# Patient Record
Sex: FEMALE | Race: WHITE | Employment: FULL TIME | URBAN - METROPOLITAN AREA
[De-identification: names, ages, dates, MRNs, and addresses within clinical notes are randomized per-mention and may not be internally consistent; named-entity substitution may affect disease eponyms.]

---

## 2017-02-10 ENCOUNTER — OFFICE VISIT (OUTPATIENT)
Dept: FAMILY MEDICINE CLINIC | Age: 52
End: 2017-02-10

## 2017-02-10 VITALS
RESPIRATION RATE: 16 BRPM | HEIGHT: 68 IN | WEIGHT: 230.38 LBS | DIASTOLIC BLOOD PRESSURE: 64 MMHG | SYSTOLIC BLOOD PRESSURE: 104 MMHG | HEART RATE: 69 BPM | OXYGEN SATURATION: 98 % | TEMPERATURE: 98.1 F | BODY MASS INDEX: 34.92 KG/M2

## 2017-02-10 DIAGNOSIS — M25.572 ACUTE LEFT ANKLE PAIN: Primary | ICD-10-CM

## 2017-02-10 DIAGNOSIS — J01.00 ACUTE MAXILLARY SINUSITIS, RECURRENCE NOT SPECIFIED: ICD-10-CM

## 2017-02-10 RX ORDER — AZITHROMYCIN 250 MG/1
TABLET, FILM COATED ORAL
Qty: 6 TAB | Refills: 0 | Status: SHIPPED | OUTPATIENT
Start: 2017-02-10 | End: 2017-08-29 | Stop reason: ALTCHOICE

## 2017-02-10 NOTE — PROGRESS NOTES
1. Have you been to the ER, urgent care clinic since your last visit? Hospitalized since your last visit? No    2. Have you seen or consulted any other health care providers outside of the [address] [place] [address] since your last visit? Include any pap smears or colon screening. Yes Dr Maren Chavira 1/16/17 for back & neck pain    Chief Complaint   Patient presents with    Foot Pain     left, since 8/2016    Cold Symptoms     sinus drainage, sinus pressure, ear fullness, fatigue x 2 days     Pt states she has left foot pain sin 8/2016. She also reports sinus drainage, sinus pressure, ear fullness & fatigue x 2 days. She is currently on sudafed.

## 2017-02-10 NOTE — MR AVS SNAPSHOT
Visit Information Date & Time Provider Department Dept. Phone Encounter #  
 2/10/2017  2:45 PM [address] [phone] [id number] Upcoming Health Maintenance Date Due DTaP/Tdap/Td series (1 - Tdap) 4/24/1984 BREAST CANCER SCRN MAMMOGRAM 7/1/2018 PAP AKA CERVICAL CYTOLOGY 7/1/2019 COLONOSCOPY 4/2/2022 Allergies as of 2/10/2017  Review Complete On: 2/10/2017 By: Remigio Serrano LPN Severity Noted Reaction Type Reactions Amoxicillin  08/16/2011    Hives Codeine  08/16/2011    Nausea and Vomiting Current Immunizations  Never Reviewed No immunizations on file. Not reviewed this visit You Were Diagnosed With   
  
 Codes Comments Acute left ankle pain    -  Primary ICD-10-CM: M25.572 ICD-9-CM: 719.47 Acute maxillary sinusitis, recurrence not specified     ICD-10-CM: J01.00 ICD-9-CM: 461.0 Vitals BP Pulse Temp Resp Height(growth percentile) Weight(growth percentile) 104/64 69 98.1 °F (36.7 °C) (Oral) 16 5' 8\" (1.727 m) 230 lb 6 oz (104.5 kg) SpO2 BMI OB Status Smoking Status 98% 35.03 kg/m2 Ablation Never Smoker Vitals History BMI and BSA Data Body Mass Index Body Surface Area 35.03 kg/m 2 2.24 m 2 Preferred Pharmacy Pharmacy Name Phone [id number] [address] [phone] Your Updated Medication List  
  
   
This list is accurate as of: 2/10/17  3:23 PM.  Always use your most recent med list.  
  
  
  
  
 * azithromycin 250 mg tablet Commonly known as:  Jock Bone Take two tablets today then one tablet daily * azithromycin 250 mg tablet Commonly known as:  Jock Bone Take two tablets today then one tablet daily CALCIUM 600 + D 600-125 mg-unit Tab Generic drug:  calcium-cholecalciferol (d3) Take  by mouth. FLONASE 50 mcg/actuation nasal spray Generic drug:  fluticasone 2 Sprays by Both Nostrils route daily. multivitamin tablet Commonly known as:  ONE A DAY Take 1 Tab by mouth daily. nystatin topical cream  
Commonly known as:  MYCOSTATIN Apply  to affected area two (2) times a day. PEPCID AC 10 mg tablet Generic drug:  famotidine Take 10 mg by mouth daily. VERAMYST 27.5 mcg/actuation nasal spray Generic drug:  fluticasone USE 2 SPRAYS NASALLY DAILY ZyrTEC 10 mg tablet Generic drug:  cetirizine Take  by mouth daily. * Notice: This list has 2 medication(s) that are the same as other medications prescribed for you. Read the directions carefully, and ask your doctor or other care provider to review them with you. Prescriptions Sent to Pharmacy Refills  
 azithromycin (ZITHROMAX Z-ADRIANNA) 250 mg tablet 0 Sig: Take two tablets today then one tablet daily Class: Normal  
 Pharmacy: [facility] Ashwini Baptiste, [facility] Ph #: [phone] We Performed the Following REFERRAL TO PODIATRY [REF90 Custom] Comments:  
 Please evaluate patient for left ankle pain/achilles tendon. To-Do List   
 02/17/2017 Imaging:  XR ANKLE LT MIN 3 V Referral Information Referral ID Referred By Referred To  
  
 [id number] Porfirio OTOOLE [address] [place] [address] Visits Status Start Date End Date 99 New Request 2/10/17 2/10/18 If your referral has a status of pending review or denied, additional information will be sent to support the outcome of this decision. Introducing [facility] & HEALTH SERVICES! Dear Connor Shepard: Thank you for requesting a [facility] account. Our records indicate that you have previously registered for a [facility] account but its currently inactive. Please call our [employer] [facility] support line at [phone]. Additional Information If you have questions, please visit the Frequently Asked Questions section of the [employer] website at [email or web address] Remember, [employer] is NOT to be used for urgent needs. For medical emergencies, dial 911. Now available from your iPhone and Android! Please provide this summary of care documentation to your next provider. Your primary care clinician is listed as Socorro Matthew. If you have any questions after today's visit, please call [phone].

## 2017-02-14 ENCOUNTER — TELEPHONE (OUTPATIENT)
Dept: FAMILY MEDICINE CLINIC | Age: 52
End: 2017-02-14

## 2017-02-14 NOTE — TELEPHONE ENCOUNTER
Notified pt her ankle x-ray was normal. Pt asked where does she go from here since she is still in pain. I informed she needs to see the podiatrist that Candida Gutierrez referred her to in order to evaluate that it is not a ligament problem. Dr Vero Mirza @ [phone]. Pt verbalized understanding.

## 2017-02-19 NOTE — PROGRESS NOTES
HPI/ROS  Patient complains of bilateral ear pressure/pain. Symptoms include congestion, headache described as frontal, lightheadedness, low grade fever, post nasal drip, productive cough with  yellow colored sputum, sinus pressure, tooth pain and vertigo. Onset of symptoms was 5 days ago, gradually worsening since that time. Patient is drinking plenty of fluids. .  Past history is significant for no history of pneumonia or bronchitis. Patient is non-smoker. She is using sudafed for congestion without improvement. She is having acute left ankle pain and swelling  Unknown injury    Visit Vitals    /64    Pulse 69    Temp 98.1 °F (36.7 °C) (Oral)    Resp 16    Ht 5' 8\" (1.727 m)    Wt 230 lb 6 oz (104.5 kg)    SpO2 98%    BMI 35.03 kg/m2     Physical Examination:   GENERAL ASSESSMENT: well developed and well nourished  SKIN: normal color, no lesions  HEAD: normocephalic  EYES: normal eyes  EARS: external auditory canal: clear and tympanic membrane: yellow, bulging  NOSE: normal external appearance and nares patent  MOUTH: yellow exudates of OP  NECK: normal  CHEST: normal air exchange, no rales, no rhonchi, no wheezes, respiratory effort normal with no retractions  HEART: regular rate and rhythm, normal S1/S2, no murmurs  ABDOMEN:  not examined  EXTREMITY: not examined  NEURO: not examined    Latasha Ruiz was seen today for foot pain and cold symptoms. Diagnoses and all orders for this visit:    Acute left ankle pain  -     XR ANKLE LT MIN 3 V; Future  -     REFERRAL TO PODIATRY    Acute maxillary sinusitis, recurrence not specified    Other orders  -     azithromycin (ZITHROMAX Z-ADRIANNA) 250 mg tablet; Take two tablets today then one tablet daily    xray ordered for left ankle evaluation  Recommended nsaids and ice    Reveiwed adr/se of medication  Push fluids, rest, suggested mucinex for congestion and drainage  Recheck 5-7 days if sx not improved.     I have discussed the diagnosis with the patient and the intended plan as seen in the above orders. The patient has received an after-visit summary and questions were answered concerning future plans. Patient conveyed understanding of the plan at the time of the visit.     Lior Golden MSN, ANP  2/19/2017

## 2017-02-22 DIAGNOSIS — M25.572 ACUTE LEFT ANKLE PAIN: ICD-10-CM

## 2017-08-29 ENCOUNTER — OFFICE VISIT (OUTPATIENT)
Dept: FAMILY MEDICINE CLINIC | Age: 52
End: 2017-08-29

## 2017-08-29 VITALS
WEIGHT: 232 LBS | RESPIRATION RATE: 12 BRPM | TEMPERATURE: 98.7 F | SYSTOLIC BLOOD PRESSURE: 118 MMHG | HEIGHT: 68 IN | BODY MASS INDEX: 35.16 KG/M2 | HEART RATE: 54 BPM | OXYGEN SATURATION: 99 % | DIASTOLIC BLOOD PRESSURE: 64 MMHG

## 2017-08-29 DIAGNOSIS — Z00.00 WELL ADULT EXAM: Primary | ICD-10-CM

## 2017-08-29 DIAGNOSIS — R73.01 IMPAIRED FASTING BLOOD SUGAR: ICD-10-CM
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
## 2017-08-29 DIAGNOSIS — S40.262A TICK BITE OF SHOULDER, LEFT, INITIAL ENCOUNTER: ICD-10-CM

## 2017-08-29 DIAGNOSIS — Z23 NEED FOR TDAP VACCINATION: ICD-10-CM

## 2017-08-29 RX ORDER — DOXYCYCLINE 100 MG/1
100 TABLET ORAL 2 TIMES DAILY
Qty: 14 TAB | Refills: 0 | Status: SHIPPED | OUTPATIENT
Start: 2017-08-29 | End: 2017-09-05

## 2017-08-29 NOTE — PROGRESS NOTES
Chief Complaint   Patient presents with    Complete Physical    Labs     fasting    Insect Bite     tick bite over the weekend

## 2017-08-29 NOTE — MR AVS SNAPSHOT
Visit Information Date & Time Provider Department Dept. Phone Encounter #  
 8/29/2017  7:30 AM Froy Collins NP [address] [facility] [phone] [id number] Upcoming Health Maintenance Date Due DTaP/Tdap/Td series (1 - Tdap) 4/24/1984 BREAST CANCER SCRN MAMMOGRAM 7/18/2018 PAP AKA CERVICAL CYTOLOGY 7/1/2019 COLONOSCOPY 4/2/2022 Allergies as of 8/29/2017  Review Complete On: 8/29/2017 By: Froy Collins NP Severity Noted Reaction Type Reactions Amoxicillin  08/16/2011    Hives Codeine  08/16/2011    Nausea and Vomiting Current Immunizations  Never Reviewed Name Date Tdap  Incomplete Not reviewed this visit You Were Diagnosed With   
  
 Codes Comments Well adult exam    -  Primary ICD-10-CM: Z00.00 ICD-9-CM: V70.0 Impaired fasting blood sugar     ICD-10-CM: R73.01 
ICD-9-CM: 790.21 Tick bite of shoulder, left, initial encounter     ICD-10-CM: O09.362I, W57. Eulalio Seal ICD-9-CM: 912.4, E906.4 Need for Tdap vaccination     ICD-10-CM: C23 ICD-9-CM: V06.1 Vitals BP Pulse Temp Resp Height(growth percentile) Weight(growth percentile)  
 118/64 (!) 54 98.7 °F (37.1 °C) 12 5' 8\" (1.727 m) 232 lb (105.2 kg) SpO2 BMI OB Status Smoking Status 99% 35.28 kg/m2 Ablation Never Smoker Vitals History BMI and BSA Data Body Mass Index Body Surface Area  
 35.28 kg/m 2 2.25 m 2 Preferred Pharmacy Pharmacy Name Phone [facility] [address] [phone] Your Updated Medication List  
  
   
This list is accurate as of: 8/29/17  7:40 AM.  Always use your most recent med list.  
  
  
  
  
 CALCIUM 600 + D 600-125 mg-unit Tab Generic drug:  calcium-cholecalciferol (d3) Take  by mouth. doxycycline 100 mg tablet Commonly known as:  ADOXA Take 1 Tab by mouth two (2) times a day for 7 days. FLONASE 50 mcg/actuation nasal spray Generic drug:  fluticasone 2 Sprays by Both Nostrils route daily. multivitamin tablet Commonly known as:  ONE A DAY Take 1 Tab by mouth daily. ZyrTEC 10 mg tablet Generic drug:  cetirizine Take  by mouth daily. Prescriptions Sent to Pharmacy Refills  
 doxycycline (ADOXA) 100 mg tablet 0 Sig: Take 1 Tab by mouth two (2) times a day for 7 days. Class: Normal  
 Pharmacy: [facility] Ashwini Baptiste, [facility] [facility] Ph #: [phone] Route: Oral  
  
We Performed the Following CBC WITH AUTOMATED DIFF [30562 CPT(R)] HEMOGLOBIN A1C WITH EAG [63571 CPT(R)] LIPID PANEL [42179 CPT(R)] METABOLIC PANEL, COMPREHENSIVE [73152 CPT(R)] TETANUS, DIPHTHERIA TOXOIDS AND ACELLULAR PERTUSSIS VACCINE (TDAP), IN INDIVIDS. >=7, IM [id number] CPT(R)] TSH 3RD GENERATION [34006 CPT(R)] Introducing [facility] SERVICES! Dear Bibi Smalls: Thank you for requesting a Get[employer] account. Our records indicate that you already have an active Get[employer] account. You can access your account anytime at [email or web address] Did you know that you can access your hospital and ER discharge instructions at any time in Get10? You can also review all of your test results from your hospital stay or ER visit. Additional Information If you have questions, please visit the Frequently Asked Questions section of the Get10 website at [email or web address] Remember, Get[employer] is NOT to be used for urgent needs. For medical emergencies, dial 911. Now available from your iPhone and Android! Please provide this summary of care documentation to your next provider. Your primary care clinician is listed as Izzy Rodrigues. If you have any questions after today's visit, please call [phone].

## 2017-08-29 NOTE — PROGRESS NOTES
Subjective:   45 y.o. female for Well Woman Check. Her gyne and breast care is done elsewhere by her Ob-Gyne physician. Patient Active Problem List    Diagnosis Date Noted    Bradycardia 11/13/2015    Allergic rhinitis 08/16/2011    Impaired fasting blood sugar 08/16/2011     Current Outpatient Prescriptions   Medication Sig Dispense Refill    doxycycline (ADOXA) 100 mg tablet Take 1 Tab by mouth two (2) times a day for 7 days. 14 Tab 0    fluticasone (FLONASE) 50 mcg/actuation nasal spray 2 Sprays by Both Nostrils route daily.  calcium-cholecalciferol, d3, (CALCIUM 600 + D) 600-125 mg-unit tab Take  by mouth.  multivitamin (ONE A DAY) tablet Take 1 Tab by mouth daily.  cetirizine (ZYRTEC) 10 mg tablet Take  by mouth daily. Family History   Problem Relation Age of Onset    Diabetes Father     High Cholesterol Father     Cancer Father      colon cancer    Other Father      womack's esophagus    Diabetes Sister     Cancer Mother      melenoma    Heart Disease Sister      Social History   Substance Use Topics    Smoking status: Never Smoker    Smokeless tobacco: Never Used    Alcohol use 0.0 oz/week     0 Standard drinks or equivalent per week      Comment: socially        ROS: Feeling generally well. No TIA's or unusual headaches, no dysphagia. No prolonged cough. No dyspnea or chest pain on exertion. No abdominal pain, change in bowel habits, black or bloody stools. No urinary tract symptoms. No new or unusual musculoskeletal symptoms. Specific concerns today: she has a swollen tick bite from last week that is red and itches severely, no ring formation. Objective: The patient appears well, alert, oriented x 3, in no distress. Visit Vitals    /64    Pulse (!) 54    Temp 98.7 °F (37.1 °C)    Resp 12    Ht 5' 8\" (1.727 m)    Wt 232 lb (105.2 kg)    SpO2 99%    BMI 35.28 kg/m2     ENT normal.  Neck supple. No adenopathy or thyromegaly. MICHAELA.  Lungs are clear, good air entry, no wheezes, rhonchi or rales. S1 and S2 normal, no murmurs, regular rate and rhythm. Abdomen soft without tenderness, guarding, mass or organomegaly. Extremities show no edema, normal peripheral pulses. Neurological is normal, no focal findings. Breast and Pelvic exams are deferred. Assessment/Plan:   Well Woman  lose weight, increase physical activity, follow low fat diet, follow low salt diet, routine labs ordered  Encounter Diagnoses   Name Primary?  Well adult exam Yes    Impaired fasting blood sugar     Tick bite of shoulder, left, initial encounter     Need for Tdap vaccination      Orders Placed This Encounter    TETANUS, DIPHTHERIA TOXOIDS AND ACELLULAR PERTUSSIS VACCINE (TDAP), IN INDIVIDS. >=7, IM    CBC WITH AUTOMATED DIFF    METABOLIC PANEL, COMPREHENSIVE    LIPID PANEL    HEMOGLOBIN A1C WITH EAG    TSH 3RD GENERATION    doxycycline (ADOXA) 100 mg tablet     Given doxy x 7 days for Lymes coverage  Labs updated as well  tdap booster given    I have discussed the diagnosis with the patient and the intended plan as seen in the above orders. The patient has received an after-visit summary and questions were answered concerning future plans. Patient conveyed understanding of the plan at the time of the visit.     Rafi Pena, MSN, ANP  8/29/2017

## 2017-08-30 LAB
ALBUMIN SERPL-MCNC: 3.8 G/DL (ref 3.5–5.5)
ALBUMIN/GLOB SERPL: 1.4 {RATIO} (ref 1.2–2.2)
ALP SERPL-CCNC: 71 IU/L (ref 39–117)
ALT SERPL-CCNC: 23 IU/L (ref 0–32)
AST SERPL-CCNC: 16 IU/L (ref 0–40)
BASOPHILS # BLD AUTO: 0 X10E3/UL (ref 0–0.2)
BASOPHILS NFR BLD AUTO: 1 %
BILIRUB SERPL-MCNC: 0.4 MG/DL (ref 0–1.2)
BUN SERPL-MCNC: 13 MG/DL (ref 6–24)
BUN/CREAT SERPL: 17 (ref 9–23)
CALCIUM SERPL-MCNC: 9.2 MG/DL (ref 8.7–10.2)
CHLORIDE SERPL-SCNC: 100 MMOL/L (ref 96–106)
CHOLEST SERPL-MCNC: 183 MG/DL (ref 100–199)
CO2 SERPL-SCNC: 26 MMOL/L (ref 18–29)
CREAT SERPL-MCNC: 0.78 MG/DL (ref 0.57–1)
EOSINOPHIL # BLD AUTO: 0.2 X10E3/UL (ref 0–0.4)
EOSINOPHIL NFR BLD AUTO: 3 %
ERYTHROCYTE [DISTWIDTH] IN BLOOD BY AUTOMATED COUNT: 13 % (ref 12.3–15.4)
EST. AVERAGE GLUCOSE BLD GHB EST-MCNC: 117 MG/DL
GLOBULIN SER CALC-MCNC: 2.7 G/DL (ref 1.5–4.5)
GLUCOSE SERPL-MCNC: 94 MG/DL (ref 65–99)
HBA1C MFR BLD: 5.7 % (ref 4.8–5.6)
HCT VFR BLD AUTO: 42.9 % (ref 34–46.6)
HDLC SERPL-MCNC: 47 MG/DL
HGB BLD-MCNC: 14.2 G/DL (ref 11.1–15.9)
IMM GRANULOCYTES # BLD: 0 X10E3/UL (ref 0–0.1)
IMM GRANULOCYTES NFR BLD: 0 %
INTERPRETATION, 910389: NORMAL
LDLC SERPL CALC-MCNC: 114 MG/DL (ref 0–99)
LYMPHOCYTES # BLD AUTO: 2.1 X10E3/UL (ref 0.7–3.1)
LYMPHOCYTES NFR BLD AUTO: 31 %
MCH RBC QN AUTO: 30.7 PG (ref 26.6–33)
MCHC RBC AUTO-ENTMCNC: 33.1 G/DL (ref 31.5–35.7)
MCV RBC AUTO: 93 FL (ref 79–97)
MONOCYTES # BLD AUTO: 0.5 X10E3/UL (ref 0.1–0.9)
MONOCYTES NFR BLD AUTO: 8 %
NEUTROPHILS # BLD AUTO: 3.8 X10E3/UL (ref 1.4–7)
NEUTROPHILS NFR BLD AUTO: 57 %
PLATELET # BLD AUTO: 241 X10E3/UL (ref 150–379)
POTASSIUM SERPL-SCNC: 4.4 MMOL/L (ref 3.5–5.2)
PROT SERPL-MCNC: 6.5 G/DL (ref 6–8.5)
RBC # BLD AUTO: 4.63 X10E6/UL (ref 3.77–5.28)
SODIUM SERPL-SCNC: 139 MMOL/L (ref 134–144)
TRIGL SERPL-MCNC: 112 MG/DL (ref 0–149)
TSH SERPL DL<=0.005 MIU/L-ACNC: 2.96 UIU/ML (ref 0.45–4.5)
VLDLC SERPL CALC-MCNC: 22 MG/DL (ref 5–40)
WBC # BLD AUTO: 6.6 X10E3/UL (ref 3.4–10.8)

## 2017-09-01 NOTE — PROGRESS NOTES
Hey there, your labs look great! Your cholesterol has gone up slightly. Watch the diet for red meat/pork, dairy products, and fried/fast foods.   Recheck 6 months fasting, Christel

## 2017-10-16 ENCOUNTER — OFFICE VISIT (OUTPATIENT)
Dept: FAMILY MEDICINE CLINIC | Age: 52
End: 2017-10-16

## 2017-10-16 VITALS
RESPIRATION RATE: 16 BRPM | HEIGHT: 68 IN | BODY MASS INDEX: 35.52 KG/M2 | TEMPERATURE: 97.8 F | HEART RATE: 60 BPM | WEIGHT: 234.38 LBS | SYSTOLIC BLOOD PRESSURE: 120 MMHG | OXYGEN SATURATION: 96 % | DIASTOLIC BLOOD PRESSURE: 82 MMHG

## 2017-10-16 DIAGNOSIS — G44.89 OTHER HEADACHE SYNDROME: Primary | ICD-10-CM

## 2017-10-16 RX ORDER — VALACYCLOVIR HYDROCHLORIDE 500 MG/1
500 TABLET, FILM COATED ORAL 3 TIMES DAILY
Qty: 21 TAB | Refills: 0 | Status: SHIPPED | OUTPATIENT
Start: 2017-10-16 | End: 2017-10-23

## 2017-10-16 NOTE — MR AVS SNAPSHOT
Visit Information Date & Time Provider Department Dept. Phone Encounter #  
 10/16/2017  1:15 PM Chaz Briseno [phone] [id number] Upcoming Health Maintenance Date Due  
 BREAST CANCER SCRN MAMMOGRAM 7/18/2018 PAP AKA CERVICAL CYTOLOGY 7/1/2019 COLONOSCOPY 4/2/2022 DTaP/Tdap/Td series (2 - Td) 8/29/2027 Allergies as of 10/16/2017  Review Complete On: 10/16/2017 By: Ran Trammell LPN Severity Noted Reaction Type Reactions Amoxicillin  08/16/2011    Hives Codeine  08/16/2011    Nausea and Vomiting Current Immunizations  Reviewed on 8/29/2017 Name Date Tdap 8/29/2017 Not reviewed this visit You Were Diagnosed With   
  
 Codes Comments Other headache syndrome    -  Primary ICD-10-CM: G44.89 ICD-9-CM: 339.89 Vitals BP Pulse Temp Resp Height(growth percentile) Weight(growth percentile) 120/82 60 97.8 °F (36.6 °C) (Oral) 16 5' 8\" (1.727 m) 234 lb 6 oz (106.3 kg) SpO2 BMI OB Status Smoking Status 96% 35.64 kg/m2 Ablation Never Smoker Vitals History BMI and BSA Data Body Mass Index Body Surface Area  
 35.64 kg/m 2 2.26 m 2 Preferred Pharmacy Pharmacy Name Phone [facility] [address] [phone] Your Updated Medication List  
  
   
This list is accurate as of: 10/16/17  1:43 PM.  Always use your most recent med list.  
  
  
  
  
 CALCIUM 600 + D 600-125 mg-unit Tab Generic drug:  calcium-cholecalciferol (d3) Take  by mouth. FLONASE 50 mcg/actuation nasal spray Generic drug:  fluticasone 2 Sprays by Both Nostrils route daily. multivitamin tablet Commonly known as:  ONE A DAY Take 1 Tab by mouth daily. valACYclovir 500 mg tablet Commonly known as:  VALTREX Take 1 Tab by mouth three (3) times daily for 7 days. ZyrTEC 10 mg tablet Generic drug:  cetirizine Take  by mouth daily. Prescriptions Sent to Pharmacy Refills  
 valACYclovir (VALTREX) 500 mg tablet 0 Sig: Take 1 Tab by mouth three (3) times daily for 7 days. Class: Normal  
 Pharmacy: [address]ini Baptiste, [address] [facility]  #: [phone] Route: Oral  
  
To-Do List   
 10/20/2017 Imaging:  CT HEAD W WO CONT Introducing [facility] SERVICES! Dear Emily Collins: Thank you for requesting a [employer]Sick account. Our records indicate that you already have an active NoteSick account. You can access your account anytime at [email or web address]"[employer]/"[employer]" Did you know that you can access your hospital and ER discharge instructions at any time in NoteSick? You can also review all of your test results from your hospital stay or ER visit. Additional Information If you have questions, please visit the Frequently Asked Questions section of the NoteSick website at https://"[employer]/"[employer]"/. Remember, NoteSick is NOT to be used for urgent needs. For medical emergencies, dial 911. Now available from your iPhone and Android! Please provide this summary of care documentation to your next provider. Your primary care clinician is listed as Angelina Khoury. If you have any questions after today's visit, please call [phone].

## 2017-10-16 NOTE — PROGRESS NOTES
1. Have you been to the ER, urgent care clinic since your last visit? Hospitalized since your last visit? Yes [facility] on 10/13/17 for mammogram    2. Have you seen or consulted any other health care providers outside of the [address] [place] [address] since your last visit? Include any pap smears or colon screening. No    Chief Complaint   Patient presents with    Hair/Scalp Problem     left side of head the scalp is sore x 3 wks     Pt states she has soreness of the left side of the scalp x 3 wks.

## 2017-10-16 NOTE — PROGRESS NOTES
HISTORY OF PRESENT ILLNESS  Shonna Garcia is a 45 y.o. female. HPI  Here for left sided and back of head pain, feels superficial like bumped head but no brusing, rash, or known injury  Pretty constant for 3 weeks  Does have migraine but not like that, has not been sick, no neck pain  Does have quite a bit of stress  No vision changes  otc nsaids have done nothing for the pain    ROS  A comprehensive review of system was obtained and negative except findings in the HPI    Visit Vitals    /82    Pulse 60    Temp 97.8 °F (36.6 °C) (Oral)    Resp 16    Ht 5' 8\" (1.727 m)    Wt 234 lb 6 oz (106.3 kg)    SpO2 96%    BMI 35.64 kg/m2     Physical Exam   Constitutional: She is oriented to person, place, and time. She appears well-developed and well-nourished. Neck: No JVD present. Cardiovascular: Normal rate, regular rhythm and intact distal pulses. Exam reveals no gallop and no friction rub. No murmur heard. Pulmonary/Chest: Effort normal and breath sounds normal. No respiratory distress. She has no wheezes. Musculoskeletal: She exhibits no edema. Neurological: She is alert and oriented to person, place, and time. Skin: Skin is warm. No rash noted. No erythema. Completely normal scalp and head assessment   Nursing note and vitals reviewed. ASSESSMENT and PLAN  Encounter Diagnoses   Name Primary?  Other headache syndrome Yes     Orders Placed This Encounter    CT HEAD W WO CONT    valACYclovir (VALTREX) 500 mg tablet     CT Head to eval for arteritis etc  Start valtrex for ?shingles type pain  Dev plan with results    I have discussed the diagnosis with the patient and the intended plan as seen in the above orders. The patient has received an after-visit summary and questions were answered concerning future plans. Patient conveyed understanding of the plan at the time of the visit.     Angelina Khoury, MSN, ANP  10/16/2017

## 2017-10-18 ENCOUNTER — HOSPITAL ENCOUNTER (OUTPATIENT)
Dept: CT IMAGING | Age: 52
Discharge: HOME | End: 2017-10-18
Attending: NURSE PRACTITIONER
Payer: COMMERCIAL

## 2017-10-18 DIAGNOSIS — G44.89 OTHER HEADACHE SYNDROME: ICD-10-CM

## 2017-10-18 PROCEDURE — 70450 CT HEAD/BRAIN W/O DYE: CPT

## 2017-10-18 NOTE — PROGRESS NOTES
Hey there, your CT shows just an old fracture of the left jaw that has some arthritis in it. No signs of mass, stroke or any issues of the brain. I would treat this as a spasm/tension type headache and pain with nsaids and muscle relaxer.  Rg Rosales

## 2018-02-28 ENCOUNTER — OFFICE VISIT (OUTPATIENT)
Dept: FAMILY MEDICINE CLINIC | Age: 53
End: 2018-02-28

## 2018-02-28 VITALS
TEMPERATURE: 97.9 F | HEIGHT: 68 IN | SYSTOLIC BLOOD PRESSURE: 109 MMHG | OXYGEN SATURATION: 98 % | RESPIRATION RATE: 16 BRPM | BODY MASS INDEX: 35.52 KG/M2 | WEIGHT: 234.38 LBS | HEART RATE: 86 BPM | DIASTOLIC BLOOD PRESSURE: 71 MMHG

## 2018-02-28 DIAGNOSIS — E78.00 HYPERCHOLESTEREMIA: ICD-10-CM

## 2018-02-28 DIAGNOSIS — G43.009 MIGRAINE WITHOUT AURA AND WITHOUT STATUS MIGRAINOSUS, NOT INTRACTABLE: Primary | ICD-10-CM

## 2018-02-28 RX ORDER — LORATADINE 10 MG/1
10 TABLET ORAL
COMMUNITY

## 2018-02-28 RX ORDER — KETOROLAC TROMETHAMINE 30 MG/ML
60 INJECTION, SOLUTION INTRAMUSCULAR; INTRAVENOUS ONCE
Qty: 2 ML | Refills: 0
Start: 2018-02-28 | End: 2018-02-28

## 2018-02-28 RX ORDER — ZOLMITRIPTAN 5 MG/1
5 TABLET, ORALLY DISINTEGRATING ORAL AS NEEDED
Qty: 9 TAB | Refills: 5 | Status: SHIPPED | OUTPATIENT
Start: 2018-02-28 | End: 2018-04-24 | Stop reason: SINTOL

## 2018-02-28 NOTE — PROGRESS NOTES
HISTORY OF PRESENT ILLNESS  Camelia Cifuentes is a 45 y.o. female. HPI  Patient here for follow up high cholesterol  Doing diet changes and no medication  Trying to eat more low fat  When she fasts for any labs or procedures it triggers a severe migraine that includes photophobia and nausea  Only using advil, has never been given actual migraine medication  When she had colonscopy last mo she had Toradol shot in that office to do the nausea/vomiting from fasting for the procedure  The FamHx, SocHx, MedHx, Medication, and Allergy lists have been reviewed and updated in the chart. ROS  A comprehensive review of system was obtained and negative except findings in the HPI    Visit Vitals    /71    Pulse 86    Temp 97.9 °F (36.6 °C) (Oral)    Resp 16    Ht 5' 8\" (1.727 m)    Wt 234 lb 6 oz (106.3 kg)    SpO2 98%    BMI 35.64 kg/m2     Physical Exam   Constitutional: She is oriented to person, place, and time. She appears well-developed and well-nourished. Neck: No JVD present. Cardiovascular: Normal rate, regular rhythm and intact distal pulses. Exam reveals no gallop and no friction rub. No murmur heard. Pulmonary/Chest: Effort normal and breath sounds normal. No respiratory distress. She has no wheezes. Musculoskeletal: She exhibits no edema. Neurological: She is alert and oriented to person, place, and time. Skin: Skin is warm. Nursing note and vitals reviewed. ASSESSMENT and PLAN  Encounter Diagnoses   Name Primary?     Migraine without aura and without status migrainosus, not intractable Yes    Hypercholesteremia      Orders Placed This Encounter    LIPID PANEL    loratadine (CLARITIN) 10 mg tablet    ZOLMitriptan (ZOMIG-ZMT) 5 mg disintegrating tablet    ketorolac tromethamine (TORADOL) 60 mg/2 mL soln     toradol IM 60mg given today for migraine  Start zomig MLT for prophalaxis for migraine when fasting  Labs updated today    I have discussed the diagnosis with the patient and the intended plan as seen in the above orders. The patient has received an after-visit summary and questions were answered concerning future plans. Patient conveyed understanding of the plan at the time of the visit.     Angel Burger MSN, ANP  2/28/2018

## 2018-02-28 NOTE — MR AVS SNAPSHOT
[address] [address] 
[phone] Patient: Meri Vogel MRN: [id number] :1965 Visit Information Date & Time Provider Department Dept. Phone Encounter #  
 2018  9:15 AM Valentín Maguire NP 5900 [facility] [phone] [id number] Your Appointments 2018  9:15 AM  
ESTABLISHED PATIENT with Valentín Maguire NP [address] [facility] ([facility]) Appt Note: 6 month f/u,  fasting bloodwork, james 18  
 [id number] [place] [address] Road [address] [phone]  
  
   
 [address] [place] [address] Road [address] Upcoming Health Maintenance Date Due  
 BREAST CANCER SCRN MAMMOGRAM 2018 PAP AKA CERVICAL CYTOLOGY 2019 COLONOSCOPY 2022 DTaP/Tdap/Td series (2 - Td) 2027 Allergies as of 2018  Review Complete On: 2018 By: Jasen Bailon LPN Severity Noted Reaction Type Reactions Amoxicillin  2011    Hives Codeine  2011    Nausea and Vomiting Current Immunizations  Reviewed on 2017 Name Date Tdap 2017 Not reviewed this visit You Were Diagnosed With   
  
 Codes Comments Migraine without aura and without status migrainosus, not intractable    -  Primary ICD-10-CM: G43.009 ICD-9-CM: 346.10 Hypercholesteremia     ICD-10-CM: E78.00 ICD-9-CM: 272.0 Vitals BP Pulse Temp Resp Height(growth percentile) Weight(growth percentile) 109/71 86 97.9 °F (36.6 °C) (Oral) 16 5' 8\" (1.727 m) 234 lb 6 oz (106.3 kg) SpO2 BMI OB Status Smoking Status 98% 35.64 kg/m2 Ablation Never Smoker Vitals History BMI and BSA Data Body Mass Index Body Surface Area  
 35.64 kg/m 2 2.26 m 2 Preferred Pharmacy Pharmacy Name Phone [id number] & [address] [phone] Your Updated Medication List  
  
   
 This list is accurate as of 2/28/18  9:13 AM.  Always use your most recent med list.  
  
  
  
  
 CALCIUM 600 + D 600-125 mg-unit Tab Generic drug:  calcium-cholecalciferol (d3) Take  by mouth. CLARITIN 10 mg tablet Generic drug:  loratadine Take 10 mg by mouth. FLONASE 50 mcg/actuation nasal spray Generic drug:  fluticasone 2 Sprays by Both Nostrils route daily. ketorolac tromethamine 60 mg/2 mL Soln Commonly known as:  TORADOL  
2 mL by IntraMUSCular route once for 1 dose. multivitamin tablet Commonly known as:  ONE A DAY Take 1 Tab by mouth daily. ZOLMitriptan 5 mg disintegrating tablet Commonly known as:  ZOMIG-ZMT Take 1 Tab by mouth as needed for Migraine. Repeat 2 hours if not completely gone, no more than 2 in 24 hours ZyrTEC 10 mg tablet Generic drug:  cetirizine Take  by mouth daily. Prescriptions Sent to Pharmacy Refills ZOLMitriptan (ZOMIG-ZMT) 5 mg disintegrating tablet 5 Sig: Take 1 Tab by mouth as needed for Migraine. Repeat 2 hours if not completely gone, no more than 2 in 24 hours Class: Normal  
 Pharmacy: [facility] Ashwini Baptiste, [address] [place] [address],[address] Ph #: [phone] Route: Oral  
  
We Performed the Following KETOROLAC TROMETHAMINE INJ [ [facility]] LIPID PANEL [25300 CPT(R)] GA THER/PROPH/DIAG INJECTION, SUBCUT/IM [id number] CPT(R)] Introducing [facility] & HEALTH SERVICES! Dear Lynn Perdomo: Thank you for requesting a bluebird bio account. Our records indicate that you already have an active bluebird bio account. You can access your account anytime at [email or web address] Did you know that you can access your hospital and ER discharge instructions at any time in bluebird bio? You can also review all of your test results from your hospital stay or ER visit. Additional Information If you have questions, please visit the Frequently Asked Questions section of the Branch website at [email or web address] Remember, Branch is NOT to be used for urgent needs. For medical emergencies, dial 911. Now available from your iPhone and Android! Please provide this summary of care documentation to your next provider. Your primary care clinician is listed as Indira Santana. If you have any questions after today's visit, please call [phone].

## 2018-02-28 NOTE — PROGRESS NOTES
1. Have you been to the ER, urgent care clinic since your last visit? Hospitalized since your last visit? No    2. Have you seen or consulted any other health care providers outside of the [address] [place] [address] since your last visit? Include any pap smears or colon screening. No    Chief Complaint   Patient presents with    Follow-up     6 mo f/u, med ck    Labs    Headache     pt currently has a migraine, only takes ibuprofen     Pt currently has a migraine, but only takes ibuprofen because she is concerned about other options.

## 2018-03-01 LAB
CHOLEST SERPL-MCNC: 184 MG/DL (ref 100–199)
HDLC SERPL-MCNC: 44 MG/DL
INTERPRETATION, 910389: NORMAL
LDLC SERPL CALC-MCNC: 108 MG/DL (ref 0–99)
TRIGL SERPL-MCNC: 161 MG/DL (ref 0–149)
VLDLC SERPL CALC-MCNC: 32 MG/DL (ref 5–40)

## 2018-03-01 NOTE — PROGRESS NOTES
Hey there, your cholesterol has made great progress.  No meds needed at this time, recheck 6 months fasting, Tanja Cabrera

## 2018-04-24 ENCOUNTER — OFFICE VISIT (OUTPATIENT)
Dept: FAMILY MEDICINE CLINIC | Age: 53
End: 2018-04-24

## 2018-04-24 VITALS
DIASTOLIC BLOOD PRESSURE: 76 MMHG | RESPIRATION RATE: 18 BRPM | OXYGEN SATURATION: 98 % | SYSTOLIC BLOOD PRESSURE: 118 MMHG | HEART RATE: 67 BPM | HEIGHT: 68 IN | WEIGHT: 238.8 LBS | TEMPERATURE: 98.8 F | BODY MASS INDEX: 36.19 KG/M2

## 2018-04-24 DIAGNOSIS — G43.109 MIGRAINE WITH AURA AND WITHOUT STATUS MIGRAINOSUS, NOT INTRACTABLE: ICD-10-CM

## 2018-04-24 DIAGNOSIS — M72.2 PLANTAR FASCIITIS: Primary | ICD-10-CM

## 2018-04-24 PROBLEM — E66.01 SEVERE OBESITY (BMI 35.0-39.9) WITH COMORBIDITY (HCC): Status: ACTIVE | Noted: 2018-04-24

## 2018-04-24 RX ORDER — ELETRIPTAN HYDROBROMIDE 40 MG/1
40 TABLET, FILM COATED ORAL
Qty: 6 TAB | Refills: 0 | Status: SHIPPED | OUTPATIENT
Start: 2018-04-24 | End: 2018-09-26 | Stop reason: SDUPTHER

## 2018-04-24 NOTE — PROGRESS NOTES
HISTORY OF PRESENT ILLNESS  Kaley Noel is a 52 y.o. female. HPI  Pt seen in the office today for right heel pain on and off x's 1 year. She reports the pain has now become painful everyday x's 8 weeks  Pt has tried Aleve OTC, icing & stretching the heel  Already has insert in shoe for scoliosis and leg length change    Migraine med taken for headache, used Zomig for the first time  Gave her terrible burning sensation of the enire right side of the face  Took 4 hours to wear off  Has never been on any other migraine med    ROS  A comprehensive review of system was obtained and negative except findings in the HPI    Visit Vitals    /76 (BP 1 Location: Left arm, BP Patient Position: Sitting)    Pulse 67    Temp 98.8 °F (37.1 °C) (Oral)    Resp 18    Ht 5' 8\" (1.727 m)    Wt 238 lb 12.8 oz (108.3 kg)    SpO2 98%    BMI 36.31 kg/m2     Physical Exam   Constitutional: She is oriented to person, place, and time. She appears well-developed and well-nourished. Neck: No JVD present. Cardiovascular: Normal rate, regular rhythm and intact distal pulses. Exam reveals no gallop and no friction rub. No murmur heard. Pulmonary/Chest: Effort normal and breath sounds normal. No respiratory distress. She has no wheezes. Musculoskeletal: She exhibits tenderness. She exhibits no edema. Center of left heel point tender on palp   Neurological: She is alert and oriented to person, place, and time. Skin: Skin is warm. Nursing note and vitals reviewed. ASSESSMENT and PLAN  Encounter Diagnoses   Name Primary?     Plantar fasciitis Yes    Migraine with aura and without status migrainosus, not intractable      Orders Placed This Encounter    REFERRAL TO PODIATRY    eletriptan (RELPAX) 40 mg tablet     Reviewed the use of shoe inserts  Referral to Dr. Meg Carter for eval    Change to Relpax for migraine relief, reviewed how to take and what to expect  I have discussed the diagnosis with the patient and the intended plan as seen in the above orders. The patient has received an after-visit summary and questions were answered concerning future plans. Patient conveyed understanding of the plan at the time of the visit.     Al Bean MSN, ANP  4/24/2018

## 2018-04-24 NOTE — PATIENT INSTRUCTIONS
Body Mass Index: Care Instructions  Your Care Instructions    Body mass index (BMI) can help you see if your weight is raising your risk for health problems. It uses a formula to compare how much you weigh with how tall you are. · A BMI lower than 18.5 is considered underweight. · A BMI between 18.5 and 24.9 is considered healthy. · A BMI between 25 and 29.9 is considered overweight. A BMI of 30 or higher is considered obese. If your BMI is in the normal range, it means that you have a lower risk for weight-related health problems. If your BMI is in the overweight or obese range, you may be at increased risk for weight-related health problems, such as high blood pressure, heart disease, stroke, arthritis or joint pain, and diabetes. If your BMI is in the underweight range, you may be at increased risk for health problems such as fatigue, lower protection (immunity) against illness, muscle loss, bone loss, hair loss, and hormone problems. BMI is just one measure of your risk for weight-related health problems. You may be at higher risk for health problems if you are not active, you eat an unhealthy diet, or you drink too much alcohol or use tobacco products. Follow-up care is a key part of your treatment and safety. Be sure to make and go to all appointments, and call your doctor if you are having problems. It's also a good idea to know your test results and keep a list of the medicines you take. How can you care for yourself at home? · Practice healthy eating habits. This includes eating plenty of fruits, vegetables, whole grains, lean protein, and low-fat dairy. · If your doctor recommends it, get more exercise. Walking is a good choice. Bit by bit, increase the amount you walk every day. Try for at least 30 minutes on most days of the week. · Do not smoke. Smoking can increase your risk for health problems. If you need help quitting, talk to your doctor about stop-smoking programs and medicines. These can increase your chances of quitting for good. · Limit alcohol to 2 drinks a day for men and 1 drink a day for women. Too much alcohol can cause health problems. If you have a BMI higher than 25  · Your doctor may do other tests to check your risk for weight-related health problems. This may include measuring the distance around your waist. A waist measurement of more than 40 inches in men or 35 inches in women can increase the risk of weight-related health problems. · Talk with your doctor about steps you can take to stay healthy or improve your health. You may need to make lifestyle changes to lose weight and stay healthy, such as changing your diet and getting regular exercise. If you have a BMI lower than 18.5  · Your doctor may do other tests to check your risk for health problems. · Talk with your doctor about steps you can take to stay healthy or improve your health. You may need to make lifestyle changes to gain or maintain weight and stay healthy, such as getting more healthy foods in your diet and doing exercises to build muscle. Where can you learn more? Go to http://emelina-anton.info/. Enter S176 in the search box to learn more about \"Body Mass Index: Care Instructions. \"  Current as of: October 13, 2016  Content Version: 11.4  © 8196-8623 Healthwise, Incorporated. Care instructions adapted under license by [employer] (which disclaims liability or warranty for this information). If you have questions about a medical condition or this instruction, always ask your healthcare professional. [address] any warranty or liability for your use of this information.

## 2018-04-24 NOTE — PROGRESS NOTES
Chief Complaint   Patient presents with    Foot Pain     Right heel     Pt seen in the office today for right heel pain on and off x's 1 year.  She reports the pain has now become painful everyday x's 8 weeks  Pt has tried Aleve OTC, icing & stretching the heel

## 2018-04-24 NOTE — MR AVS SNAPSHOT
[address] [address] 
[phone] Patient: Walter Berkowitz MRN: [id number] :1965 Visit Information Date & Time Provider Department Dept. Phone Encounter #  
 2018  2:45 PM Pratima Devries, [address] [phone] [id number] Upcoming Health Maintenance Date Due  
 BREAST CANCER SCRN MAMMOGRAM 2018 PAP AKA CERVICAL CYTOLOGY 2019 COLONOSCOPY 2022 DTaP/Tdap/Td series (2 - Td) 2027 Allergies as of 2018  Review Complete On: 2018 By: Pratima Devries NP Severity Noted Reaction Type Reactions Amoxicillin  2011    Hives Codeine  2011    Nausea and Vomiting Zomig [Zolmitriptan]  2018    Other (comments) Facial neuralgia and side of head burning, chest tightness and left arm pain Current Immunizations  Reviewed on 2017 Name Date Tdap 2017 Not reviewed this visit You Were Diagnosed With   
  
 Codes Comments Plantar fasciitis    -  Primary ICD-10-CM: M72.2 ICD-9-CM: 728.71 Migraine with aura and without status migrainosus, not intractable     ICD-10-CM: G43.109 ICD-9-CM: 346.00 Vitals BP Pulse Temp Resp Height(growth percentile) Weight(growth percentile) 118/76 (BP 1 Location: Left arm, BP Patient Position: Sitting) 67 98.8 °F (37.1 °C) (Oral) 18 5' 8\" (1.727 m) 238 lb 12.8 oz (108.3 kg) SpO2 BMI OB Status Smoking Status 98% 36.31 kg/m2 Ablation Never Smoker Vitals History BMI and BSA Data Body Mass Index Body Surface Area  
 36.31 kg/m 2 2.28 m 2 Preferred Pharmacy Pharmacy Name Phone [id number] & [address] [facility] [place] [phone] Your Updated Medication List  
  
   
This list is accurate as of 18  3:08 PM.  Always use your most recent med list.  
  
  
  
  
 CALCIUM 600 + D 600-125 mg-unit Tab Generic drug:  calcium-cholecalciferol (d3) Take  by mouth. CLARITIN 10 mg tablet Generic drug:  loratadine Take 10 mg by mouth.  
  
 eletriptan 40 mg tablet Commonly known as:  RELPAX Take 1 Tab by mouth once as needed for up to 1 dose. may repeat in 2 hours if necessary FLONASE 50 mcg/actuation nasal spray Generic drug:  fluticasone 2 Sprays by Both Nostrils route daily. multivitamin tablet Commonly known as:  ONE A DAY Take 1 Tab by mouth daily. ZyrTEC 10 mg tablet Generic drug:  cetirizine Take  by mouth daily. Prescriptions Sent to Pharmacy Refills  
 eletriptan (RELPAX) 40 mg tablet 0 Sig: Take 1 Tab by mouth once as needed for up to 1 dose. may repeat in 2 hours if necessary Class: Normal  
 Pharmacy: [facility]0 Ashwini Baptiste, [facility] [facility],[address] Ph #: [phone] Route: Oral  
  
We Performed the Following REFERRAL TO PODIATRY [REF90 Custom] Referral Information Referral ID Referred By Referred To  
  
 [id number] [address] [facility] [place] Visits Status Start Date End Date 1 New Request 4/24/18 4/24/19 If your referral has a status of pending review or denied, additional information will be sent to support the outcome of this decision. Introducing [facility] & HEALTH SERVICES! Dear Lorena Jones: Thank you for requesting a [facility] account. Our records indicate that you already have an active [facility] account. You can access your account anytime at [email or web address] Did you know that you can access your hospital and ER discharge instructions at any time in [employer] [facility]? You can also review all of your test results from your hospital stay or ER visit. Additional Information If you have questions, please visit the Frequently Asked Questions section of the [facility] website at [email or web address] Remember, MyChart is NOT to be used for urgent needs. For medical emergencies, dial 911. Now available from your iPhone and Android! Please provide this summary of care documentation to your next provider. Your primary care clinician is listed as Ramandeep Tompkins. If you have any questions after today's visit, please call [phone].

## 2018-09-26 ENCOUNTER — OFFICE VISIT (OUTPATIENT)
Dept: FAMILY MEDICINE CLINIC | Age: 53
End: 2018-09-26

## 2018-09-26 VITALS
TEMPERATURE: 97.9 F | HEIGHT: 68 IN | HEART RATE: 57 BPM | WEIGHT: 238 LBS | RESPIRATION RATE: 16 BRPM | BODY MASS INDEX: 36.07 KG/M2 | OXYGEN SATURATION: 98 % | SYSTOLIC BLOOD PRESSURE: 110 MMHG | DIASTOLIC BLOOD PRESSURE: 58 MMHG

## 2018-09-26 DIAGNOSIS — E78.00 HYPERCHOLESTEREMIA: ICD-10-CM

## 2018-09-26 DIAGNOSIS — G43.109 MIGRAINE WITH AURA AND WITHOUT STATUS MIGRAINOSUS, NOT INTRACTABLE: ICD-10-CM

## 2018-09-26 DIAGNOSIS — R73.01 IMPAIRED FASTING BLOOD SUGAR: ICD-10-CM

## 2018-09-26 DIAGNOSIS — Z00.00 WELL ADULT EXAM: Primary | ICD-10-CM

## 2018-09-26 RX ORDER — ELETRIPTAN HYDROBROMIDE 40 MG/1
40 TABLET, FILM COATED ORAL
Qty: 6 TAB | Refills: 5 | Status: SHIPPED | OUTPATIENT
Start: 2018-09-26 | End: 2018-09-26

## 2018-09-26 NOTE — PROGRESS NOTES
Chief Complaint   Patient presents with    Complete Physical    Labs      1. Have you been to the ER, urgent care clinic since your last visit? Hospitalized since your last visit?has been for bronchitis    2. Have you seen or consulted any other health care providers outside of the [address] [place] [address] since your last visit? Include any pap smears or colon screening. saw Dr. Kanika Rasheed, and Chantale Messina for PT, Dr. Wellington King for ankle. Has had a normal colonoscopy with hemorrhoids noted.

## 2018-09-26 NOTE — MR AVS SNAPSHOT
[address] [place] [address] 
[phone] Patient: Ever Braun MRN: [id number] :1965 Visit Information Date & Time Provider Department Dept. Phone Encounter #  
 2018  7:30 AM Iliana Monroy NP 7044 [facility] [phone] [id number] Upcoming Health Maintenance Date Due Shingrix Vaccine Age 50> (1 of 2) 2013 BREAST CANCER SCRN MAMMOGRAM 2018 PAP AKA CERVICAL CYTOLOGY 2019 COLONOSCOPY 2021 DTaP/Tdap/Td series (2 - Td) 2027 Allergies as of 2018  Review Complete On: 2018 By: Kelly Jeter LPN Severity Noted Reaction Type Reactions Amoxicillin  2011    Hives Codeine  2011    Nausea and Vomiting Zomig [Zolmitriptan]  2018    Other (comments) Facial neuralgia and side of head burning, chest tightness and left arm pain Current Immunizations  Reviewed on 2017 Name Date Tdap 2017 Not reviewed this visit You Were Diagnosed With   
  
 Codes Comments Well adult exam    -  Primary ICD-10-CM: Z00.00 ICD-9-CM: V70.0 Hypercholesteremia     ICD-10-CM: E78.00 ICD-9-CM: 272.0 Migraine with aura and without status migrainosus, not intractable     ICD-10-CM: G43.109 ICD-9-CM: 346.00 Impaired fasting blood sugar     ICD-10-CM: R73.01 
ICD-9-CM: 790.21 Vitals BP Pulse Temp Resp Height(growth percentile) Weight(growth percentile) 110/58 (!) 57 97.9 °F (36.6 °C) 16 5' 8\" (1.727 m) 238 lb (108 kg) SpO2 BMI OB Status Smoking Status 98% 36.19 kg/m2 Ablation Never Smoker Vitals History BMI and BSA Data Body Mass Index Body Surface Area  
 36.19 kg/m 2 2.28 m 2 Preferred Pharmacy Pharmacy Name Phone [id number] & [address] [phone] Your Updated Medication List  
  
   
 This list is accurate as of 9/26/18  7:48 AM.  Always use your most recent med list.  
  
  
  
  
 CALCIUM 600 + D 600-125 mg-unit Tab Generic drug:  calcium-cholecalciferol (d3) Take  by mouth. CLARITIN 10 mg tablet Generic drug:  loratadine Take 10 mg by mouth.  
  
 eletriptan 40 mg tablet Commonly known as:  RELPAX Take 1 Tab by mouth once as needed for up to 1 dose. may repeat in 2 hours if necessary FLONASE 50 mcg/actuation nasal spray Generic drug:  fluticasone 2 Sprays by Both Nostrils route daily. multivitamin tablet Commonly known as:  ONE A DAY Take 1 Tab by mouth daily. ZyrTEC 10 mg tablet Generic drug:  cetirizine Take  by mouth daily. Prescriptions Sent to Pharmacy Refills  
 eletriptan (RELPAX) 40 mg tablet 5 Sig: Take 1 Tab by mouth once as needed for up to 1 dose. may repeat in 2 hours if necessary Class: Normal  
 Pharmacy: [facility]0 Ashwini Baptiste, [facility],[address] Ph #: [phone] Route: Oral  
  
We Performed the Following CBC WITH AUTOMATED DIFF [67033 CPT(R)] HEMOGLOBIN A1C WITH EAG [22653 CPT(R)] LIPID PANEL [30098 CPT(R)] METABOLIC PANEL, COMPREHENSIVE [73670 CPT(R)] TSH 3RD GENERATION [85813 CPT(R)] Introducing \[facility]\"" & HEALTH SERVICES! Dear Pradeep Reis: Thank you for requesting a [employer] account. Our records indicate that you already have an active [employer] account. You can access your account anytime at [email or web address] Did you know that you can access your hospital and ER discharge instructions at any time in [employer]it[employer] Train? You can also review all of your test results from your hospital stay or ER visit. Additional Information If you have questions, please visit the Frequently Asked Questions section of the [employer] website at [email or web address] Remember, [employer] is NOT to be used for urgent needs. For medical emergencies, dial 911. Now available from your iPhone and Android! Please provide this summary of care documentation to your next provider. Your primary care clinician is listed as Brittney Bachelor. If you have any questions after today's visit, please call [phone].

## 2018-09-26 NOTE — PROGRESS NOTES
Subjective:   52 y.o. female for Well Woman Check. Her gyne and breast care is done elsewhere by her Ob-Gyne physician. Patient Active Problem List    Diagnosis Date Noted    Migraine with aura and without status migrainosus, not intractable 04/24/2018    Severe obesity (BMI 35.0-39. 9) with comorbidity ([facility] [address] 04/24/2018    Hypercholesteremia 02/28/2018    Bradycardia 11/13/2015    Allergic rhinitis 08/16/2011    Impaired fasting blood sugar 08/16/2011     Current Outpatient Prescriptions   Medication Sig Dispense Refill    eletriptan (RELPAX) 40 mg tablet Take 1 Tab by mouth once as needed for up to 1 dose. may repeat in 2 hours if necessary 6 Tab 5    loratadine (CLARITIN) 10 mg tablet Take 10 mg by mouth.  fluticasone (FLONASE) 50 mcg/actuation nasal spray 2 Sprays by Both Nostrils route daily.  calcium-cholecalciferol, d3, (CALCIUM 600 + D) 600-125 mg-unit tab Take  by mouth.  multivitamin (ONE A DAY) tablet Take 1 Tab by mouth daily.  cetirizine (ZYRTEC) 10 mg tablet Take  by mouth daily. Family History   Problem Relation Age of Onset    Diabetes Father     High Cholesterol Father     Cancer Father      colon cancer    Other Father      womack's esophagus    Diabetes Sister     Cancer Mother      melenoma    Heart Disease Sister      Social History   Substance Use Topics    Smoking status: Never Smoker    Smokeless tobacco: Never Used    Alcohol use 0.0 oz/week     0 Standard drinks or equivalent per week      Comment: socially             ROS: Feeling generally well. No TIA's or unusual headaches, no dysphagia. No prolonged cough. No dyspnea or chest pain on exertion. No abdominal pain, change in bowel habits, black or bloody stools. No urinary tract symptoms. No new or unusual musculoskeletal symptoms. Specific concerns today: none, does need to have refill of her Relpax for migraine. Objective:    The patient appears well, alert, oriented x 3, in no distress. Visit Vitals    /58    Pulse (!) 57    Temp 97.9 °F (36.6 °C)    Resp 16    Ht 5' 8\" (1.727 m)    Wt 238 lb (108 kg)    SpO2 98%    BMI 36.19 kg/m2     ENT normal.  Neck supple. No adenopathy or thyromegaly. MICHAELA. Lungs are clear, good air entry, no wheezes, rhonchi or rales. S1 and S2 normal, no murmurs, regular rate and rhythm. Abdomen soft without tenderness, guarding, mass or organomegaly. Extremities show no edema, normal peripheral pulses. Neurological is normal, no focal findings. Breast and Pelvic exams are deferred. Assessment/Plan:   Well Woman  lose weight, increase physical activity, follow low fat diet, follow low salt diet, routine labs ordered  Encounter Diagnoses   Name Primary?  Well adult exam Yes    Hypercholesteremia     Migraine with aura and without status migrainosus, not intractable     Impaired fasting blood sugar      Orders Placed This Encounter    CBC WITH AUTOMATED DIFF    LIPID PANEL    METABOLIC PANEL, COMPREHENSIVE    TSH 3RD GENERATION    HEMOGLOBIN A1C WITH EAG    eletriptan (RELPAX) 40 mg tablet     Labs updated today  Relpax refilled  Dev plan with results  I have discussed the diagnosis with the patient and the intended plan as seen in the above orders. The patient has received an after-visit summary and questions were answered concerning future plans. Patient conveyed understanding of the plan at the time of the visit.     Marissa Fox, MSN, ANP  9/26/2018

## 2018-09-27 LAB
ALBUMIN SERPL-MCNC: 4.1 G/DL (ref 3.5–5.5)
ALBUMIN/GLOB SERPL: 1.6 {RATIO} (ref 1.2–2.2)
ALP SERPL-CCNC: 76 IU/L (ref 39–117)
ALT SERPL-CCNC: 24 IU/L (ref 0–32)
AST SERPL-CCNC: 24 IU/L (ref 0–40)
BASOPHILS # BLD AUTO: 0 X10E3/UL (ref 0–0.2)
BASOPHILS NFR BLD AUTO: 0 %
BILIRUB SERPL-MCNC: 0.5 MG/DL (ref 0–1.2)
BUN SERPL-MCNC: 10 MG/DL (ref 6–24)
BUN/CREAT SERPL: 12 (ref 9–23)
CALCIUM SERPL-MCNC: 9.4 MG/DL (ref 8.7–10.2)
CHLORIDE SERPL-SCNC: 98 MMOL/L (ref 96–106)
CHOLEST SERPL-MCNC: 177 MG/DL (ref 100–199)
CO2 SERPL-SCNC: 23 MMOL/L (ref 20–29)
CREAT SERPL-MCNC: 0.81 MG/DL (ref 0.57–1)
EOSINOPHIL # BLD AUTO: 0.1 X10E3/UL (ref 0–0.4)
EOSINOPHIL NFR BLD AUTO: 2 %
ERYTHROCYTE [DISTWIDTH] IN BLOOD BY AUTOMATED COUNT: 13.1 % (ref 12.3–15.4)
EST. AVERAGE GLUCOSE BLD GHB EST-MCNC: 120 MG/DL
GLOBULIN SER CALC-MCNC: 2.6 G/DL (ref 1.5–4.5)
GLUCOSE SERPL-MCNC: 93 MG/DL (ref 65–99)
HBA1C MFR BLD: 5.8 % (ref 4.8–5.6)
HCT VFR BLD AUTO: 44.8 % (ref 34–46.6)
HDLC SERPL-MCNC: 48 MG/DL
HGB BLD-MCNC: 14.4 G/DL (ref 11.1–15.9)
IMM GRANULOCYTES # BLD: 0 X10E3/UL (ref 0–0.1)
IMM GRANULOCYTES NFR BLD: 0 %
INTERPRETATION, 910389: NORMAL
LDLC SERPL CALC-MCNC: 94 MG/DL (ref 0–99)
LYMPHOCYTES # BLD AUTO: 2.3 X10E3/UL (ref 0.7–3.1)
LYMPHOCYTES NFR BLD AUTO: 28 %
MCH RBC QN AUTO: 30.9 PG (ref 26.6–33)
MCHC RBC AUTO-ENTMCNC: 32.1 G/DL (ref 31.5–35.7)
MCV RBC AUTO: 96 FL (ref 79–97)
MONOCYTES # BLD AUTO: 0.5 X10E3/UL (ref 0.1–0.9)
MONOCYTES NFR BLD AUTO: 6 %
NEUTROPHILS # BLD AUTO: 5.3 X10E3/UL (ref 1.4–7)
NEUTROPHILS NFR BLD AUTO: 64 %
PLATELET # BLD AUTO: 264 X10E3/UL (ref 150–379)
POTASSIUM SERPL-SCNC: 4.8 MMOL/L (ref 3.5–5.2)
PROT SERPL-MCNC: 6.7 G/DL (ref 6–8.5)
RBC # BLD AUTO: 4.66 X10E6/UL (ref 3.77–5.28)
SODIUM SERPL-SCNC: 137 MMOL/L (ref 134–144)
TRIGL SERPL-MCNC: 173 MG/DL (ref 0–149)
TSH SERPL DL<=0.005 MIU/L-ACNC: 2.65 UIU/ML (ref 0.45–4.5)
VLDLC SERPL CALC-MCNC: 35 MG/DL (ref 5–40)
WBC # BLD AUTO: 8.3 X10E3/UL (ref 3.4–10.8)

## 2018-09-28 NOTE — PROGRESS NOTES
Hey there, your labs look great, no change in meds at this time, recheck 6 months.  [facility]

## 2018-11-27 ENCOUNTER — OFFICE VISIT (OUTPATIENT)
Dept: FAMILY MEDICINE CLINIC | Age: 53
End: 2018-11-27

## 2018-11-27 VITALS
SYSTOLIC BLOOD PRESSURE: 116 MMHG | DIASTOLIC BLOOD PRESSURE: 81 MMHG | HEIGHT: 68 IN | TEMPERATURE: 98 F | HEART RATE: 66 BPM | WEIGHT: 233 LBS | BODY MASS INDEX: 35.31 KG/M2 | RESPIRATION RATE: 16 BRPM | OXYGEN SATURATION: 99 %

## 2018-11-27 DIAGNOSIS — B35.4 TINEA CORPORIS: Primary | ICD-10-CM

## 2018-11-27 RX ORDER — CLOTRIMAZOLE AND BETAMETHASONE DIPROPIONATE 10; .64 MG/G; MG/G
CREAM TOPICAL 2 TIMES DAILY
Qty: 45 G | Refills: 1 | Status: SHIPPED | OUTPATIENT
Start: 2018-11-27 | End: 2021-12-06 | Stop reason: ALTCHOICE

## 2018-11-27 NOTE — PROGRESS NOTES
1. Have you been to the ER, urgent care clinic since your last visit? Hospitalized since your last visit? No    2. Have you seen or consulted any other health care providers outside of the [address] [place] [address] since your last visit? Include any pap smears or colon screening.  No     Chief Complaint   Patient presents with    Rash     Left Arm, x1 month

## 2018-11-28 NOTE — PROGRESS NOTES
HISTORY OF PRESENT ILLNESS  James Christianson is a 52 y.o. female. HPI  Red extremely itcy circular rash of the left forearm  No known exposures  Tried otc gold bone cream that helps minimally  No other areas of the body    ROS  A comprehensive review of system was obtained and negative except findings in the HPI    Visit Vitals  /81   Pulse 66   Temp 98 °F (36.7 °C) (Oral)   Resp 16   Ht 5' 8\" (1.727 m)   Wt 233 lb (105.7 kg)   SpO2 99%   BMI 35.43 kg/m²     Physical Exam   Constitutional: She is oriented to person, place, and time. She appears well-developed and well-nourished. No distress. Neurological: She is alert and oriented to person, place, and time. Skin: Rash noted. There is erythema. Left forearm with red circular rash 2 cm in diameter with specific red ring   Nursing note and vitals reviewed. ASSESSMENT and PLAN  Encounter Diagnoses   Name Primary?  Tinea corporis Yes     Orders Placed This Encounter    clotrimazole-betamethasone (LOTRISONE) topical cream     Given Lotrisone cream   Use bid until gone  Reviewed potential exposures    I have discussed the diagnosis with the patient and the intended plan as seen in the above orders. The patient has received an after-visit summary and questions were answered concerning future plans. Patient conveyed understanding of the plan at the time of the visit.     Kely Ramachandran, SANDRA, ANP  11/27/2018

## 2019-11-19 ENCOUNTER — HOSPITAL ENCOUNTER (OUTPATIENT)
Dept: LAB | Age: 54
Discharge: HOME | End: 2019-11-19

## 2019-11-19 ENCOUNTER — OFFICE VISIT (OUTPATIENT)
Dept: FAMILY MEDICINE CLINIC | Age: 54
End: 2019-11-19

## 2019-11-19 VITALS
HEIGHT: 68 IN | BODY MASS INDEX: 35.77 KG/M2 | DIASTOLIC BLOOD PRESSURE: 55 MMHG | SYSTOLIC BLOOD PRESSURE: 106 MMHG | TEMPERATURE: 98.5 F | RESPIRATION RATE: 14 BRPM | WEIGHT: 236 LBS | HEART RATE: 59 BPM | OXYGEN SATURATION: 95 %

## 2019-11-19 DIAGNOSIS — G43.109 MIGRAINE WITH AURA AND WITHOUT STATUS MIGRAINOSUS, NOT INTRACTABLE: ICD-10-CM

## 2019-11-19 DIAGNOSIS — Z00.00 WELL ADULT EXAM: ICD-10-CM

## 2019-11-19 DIAGNOSIS — M25.512 CHRONIC LEFT SHOULDER PAIN: ICD-10-CM

## 2019-11-19 DIAGNOSIS — E78.00 HYPERCHOLESTEREMIA: ICD-10-CM

## 2019-11-19 DIAGNOSIS — Z00.00 WELL ADULT EXAM: Primary | ICD-10-CM

## 2019-11-19 DIAGNOSIS — R00.2 PALPITATIONS: ICD-10-CM

## 2019-11-19 DIAGNOSIS — G89.29 CHRONIC LEFT SHOULDER PAIN: ICD-10-CM

## 2019-11-19 LAB
ALBUMIN SERPL-MCNC: 3.8 G/DL (ref 3.5–5)
ALBUMIN/GLOB SERPL: 1.3 {RATIO} (ref 1.1–2.2)
ALP SERPL-CCNC: 86 U/L (ref 45–117)
ALT SERPL-CCNC: 31 U/L (ref 12–78)
ANION GAP SERPL CALC-SCNC: 4 MMOL/L (ref 5–15)
AST SERPL-CCNC: 15 U/L (ref 15–37)
BASOPHILS # BLD: 0.1 K/UL (ref 0–0.1)
BASOPHILS NFR BLD: 1 % (ref 0–1)
BILIRUB SERPL-MCNC: 0.5 MG/DL (ref 0.2–1)
BUN SERPL-MCNC: 12 MG/DL (ref 6–20)
BUN/CREAT SERPL: 17 (ref 12–20)
CALCIUM SERPL-MCNC: 9.1 MG/DL (ref 8.5–10.1)
CHLORIDE SERPL-SCNC: 105 MMOL/L (ref 97–108)
CHOLEST SERPL-MCNC: 193 MG/DL
CO2 SERPL-SCNC: 30 MMOL/L (ref 21–32)
CREAT SERPL-MCNC: 0.71 MG/DL (ref 0.55–1.02)
DIFFERENTIAL METHOD BLD: NORMAL
EOSINOPHIL # BLD: 0.2 K/UL (ref 0–0.4)
EOSINOPHIL NFR BLD: 3 % (ref 0–7)
ERYTHROCYTE [DISTWIDTH] IN BLOOD BY AUTOMATED COUNT: 12.7 % (ref 11.5–14.5)
GLOBULIN SER CALC-MCNC: 3 G/DL (ref 2–4)
GLUCOSE SERPL-MCNC: 98 MG/DL (ref 65–100)
HCT VFR BLD AUTO: 46.5 % (ref 35–47)
HDLC SERPL-MCNC: 46 MG/DL
HDLC SERPL: 4.2 {RATIO} (ref 0–5)
HGB BLD-MCNC: 14.7 G/DL (ref 11.5–16)
IMM GRANULOCYTES # BLD AUTO: 0 K/UL (ref 0–0.04)
IMM GRANULOCYTES NFR BLD AUTO: 0 % (ref 0–0.5)
LDLC SERPL CALC-MCNC: 118.8 MG/DL (ref 0–100)
LIPID PROFILE,FLP: ABNORMAL
LYMPHOCYTES # BLD: 2.1 K/UL (ref 0.8–3.5)
LYMPHOCYTES NFR BLD: 31 % (ref 12–49)
MCH RBC QN AUTO: 31.1 PG (ref 26–34)
MCHC RBC AUTO-ENTMCNC: 31.6 G/DL (ref 30–36.5)
MCV RBC AUTO: 98.3 FL (ref 80–99)
MONOCYTES # BLD: 0.6 K/UL (ref 0–1)
MONOCYTES NFR BLD: 8 % (ref 5–13)
NEUTS SEG # BLD: 3.7 K/UL (ref 1.8–8)
NEUTS SEG NFR BLD: 57 % (ref 32–75)
NRBC # BLD: 0 K/UL (ref 0–0.01)
NRBC BLD-RTO: 0 PER 100 WBC
PLATELET # BLD AUTO: 233 K/UL (ref 150–400)
PMV BLD AUTO: 10.3 FL (ref 8.9–12.9)
POTASSIUM SERPL-SCNC: 4.4 MMOL/L (ref 3.5–5.1)
PROT SERPL-MCNC: 6.8 G/DL (ref 6.4–8.2)
RBC # BLD AUTO: 4.73 M/UL (ref 3.8–5.2)
SODIUM SERPL-SCNC: 139 MMOL/L (ref 136–145)
TRIGL SERPL-MCNC: 141 MG/DL (ref ?–150)
TSH SERPL DL<=0.05 MIU/L-ACNC: 2.57 UIU/ML (ref 0.36–3.74)
VLDLC SERPL CALC-MCNC: 28.2 MG/DL
WBC # BLD AUTO: 6.6 K/UL (ref 3.6–11)

## 2019-11-19 RX ORDER — ELETRIPTAN HYDROBROMIDE 40 MG/1
40 TABLET, FILM COATED ORAL
Qty: 6 TAB | Refills: 5 | Status: SHIPPED | OUTPATIENT
Start: 2019-11-19 | End: 2019-11-19

## 2019-11-19 RX ORDER — FAMOTIDINE 20 MG/1
20 TABLET, FILM COATED ORAL 2 TIMES DAILY
COMMUNITY
End: 2021-12-06 | Stop reason: ALTCHOICE

## 2019-11-19 NOTE — PROGRESS NOTES
Subjective:   62 y.o. female for Well Woman Check. Her gyne and breast care is done elsewhere by her Ob-Gyne physician. Patient Active Problem List    Diagnosis Date Noted    Migraine with aura and without status migrainosus, not intractable 04/24/2018    Severe obesity (BMI 35.0-39. 9) with comorbidity ([facility] [address] 04/24/2018    Hypercholesteremia 02/28/2018    Bradycardia 11/13/2015    Allergic rhinitis 08/16/2011    Impaired fasting blood sugar 08/16/2011     Current Outpatient Medications   Medication Sig Dispense Refill    famotidine (PEPCID) 20 mg tablet Take 20 mg by mouth two (2) times a day.  eletriptan (RELPAX) 40 mg tablet Take 1 Tab by mouth once as needed (migraine) for up to 1 dose. may repeat in 2 hours if necessary 6 Tab 5    loratadine (CLARITIN) 10 mg tablet Take 10 mg by mouth.  fluticasone (FLONASE) 50 mcg/actuation nasal spray 2 Sprays by Both Nostrils route daily.  calcium-cholecalciferol, d3, (CALCIUM 600 + D) 600-125 mg-unit tab Take  by mouth.  multivitamin (ONE A DAY) tablet Take 1 Tab by mouth daily.  cetirizine (ZYRTEC) 10 mg tablet Take  by mouth daily.  clotrimazole-betamethasone (LOTRISONE) topical cream Apply  to affected area two (2) times a day. 39 g 1     Family History   Problem Relation Age of Onset    Diabetes Father     High Cholesterol Father     Cancer Father         colon cancer    Other Father         womack's esophagus    Diabetes Sister     Cancer Mother         melenoma    Heart Disease Sister      Social History     Tobacco Use    Smoking status: Never Smoker    Smokeless tobacco: Never Used   Substance Use Topics    Alcohol use: Yes     Alcohol/week: 0.0 standard drinks     Comment: socially             ROS: Feeling generally well. No TIA's or unusual headaches, no dysphagia. No prolonged cough. No dyspnea or chest pain on exertion. No abdominal pain, change in bowel habits, black or bloody stools.   No urinary tract symptoms. No new or unusual musculoskeletal symptoms. Specific concerns today: none. Objective: The patient appears well, alert, oriented x 3, in no distress. Visit Vitals  /55 (BP 1 Location: Left arm, BP Patient Position: Sitting)   Pulse (!) 59   Temp 98.5 °F (36.9 °C) (Oral)   Resp 14   Ht 5' 8\" (1.727 m)   Wt 236 lb (107 kg)   SpO2 95%   BMI 35.88 kg/m²     ENT normal.  Neck supple. No adenopathy or thyromegaly. MICHAELA. Lungs are clear, good air entry, no wheezes, rhonchi or rales. S1 and S2 normal, no murmurs, regular rate and rhythm. Abdomen soft without tenderness, guarding, mass or organomegaly. Extremities show no edema, normal peripheral pulses. Neurological is normal, no focal findings. Breast and Pelvic exams are deferred. Assessment/Plan:   Well Woman  lose weight, increase physical activity, follow low fat diet, follow low salt diet, routine labs ordered  Encounter Diagnoses   Name Primary?  Well adult exam Yes    Chronic left shoulder pain     Migraine with aura and without status migrainosus, not intractable     Hypercholesteremia     Palpitations      Orders Placed This Encounter    LIPID PANEL    CBC WITH AUTOMATED DIFF    METABOLIC PANEL, COMPREHENSIVE    TSH 3RD GENERATION    Goradia (Shoulder/General) ORTHO ref [facility] office)    [place] Card [facility]    famotidine (PEPCID) 20 mg tablet    eletriptan (RELPAX) 40 mg tablet     I have discussed the diagnosis with the patient and the intended plan as seen in the above orders. The patient has received an after-visit summary and questions were answered concerning future plans. Patient conveyed understanding of the plan at the time of the visit.     Alisa Watson, MSN, ANP  11/19/2019

## 2019-11-19 NOTE — PROGRESS NOTES
Chief Complaint   Patient presents with    Complete Physical    Labs     Pt in office today for cpe/labs    1. Have you been to the ER, urgent care clinic since your last visit? Hospitalized since your last visit? [place] in [facility] [place] [facility] [place]    2. Have you seen or consulted any other health care providers outside of the [address] [place] [address] since your last visit? Include any pap smears or colon screening.  No       Pt has no other concerns

## 2019-11-24 NOTE — PROGRESS NOTES
Hey there, your labs overall look great. The cholesterol has started to climb higher. Watch the diet for red meat/pork, dairy products, and fried/fast foods. Recheck 3 months fasting. We may need to add a statin if the LDL does not come back down below 100.    [place]

## 2019-12-04 ENCOUNTER — HOSPITAL ENCOUNTER (OUTPATIENT)
Dept: GENERAL RADIOLOGY | Age: 54
Discharge: HOME | End: 2019-12-04
Payer: COMMERCIAL

## 2019-12-04 DIAGNOSIS — M25.512 LEFT SHOULDER PAIN: ICD-10-CM

## 2019-12-04 PROCEDURE — 73030 X-RAY EXAM OF SHOULDER: CPT

## 2019-12-06 NOTE — PROGRESS NOTES
LAST OFFICE VISIT : Visit date not found        ICD-10-CM ICD-9-CM   1. Palpitations R00.2 785.1            Geovanna De Oliveira is a 62 y.o. female with bradycardia and dyslipidemia referred by Reyes Reid NP. Cardiac risk factors: dyslipidemia  I have personally obtained the history from the patient. HISTORY OF PRESENTING ILLNESS     Pt notes she started to have some heart flutters. Pt usually notices the palpitations before her menstrual cycle and the palpitations usually last a few seconds. Pt denies the palpitations are associated with activity or eating a meal or that they wake her up at night. Pt notes that her mother and sister both have AF. Pt does not exercise regularly. Pt does not floss regularly. The patient denies chest pain/ shortness of breath, orthopnea, PND, LE edema, syncope, presyncope or fatigue. ACTIVE PROBLEM LIST     Patient Active Problem List    Diagnosis Date Noted    Migraine with aura and without status migrainosus, not intractable 04/24/2018    Severe obesity (BMI 35.0-39. 9) with comorbidity ([address] 04/24/2018    Hypercholesteremia 02/28/2018    Bradycardia 11/13/2015    Allergic rhinitis 08/16/2011    Impaired fasting blood sugar 08/16/2011           PAST MEDICAL HISTORY     Past Medical History:   Diagnosis Date    GERD (gastroesophageal reflux disease)     Headache(784.0)     migraine    Hypercholesteremia 2/28/2018           PAST SURGICAL HISTORY     Past Surgical History:   Procedure Laterality Date    HX GYN  tubiligation 8 years ago    HX GYN  ablation 2008    HX ORTHOPAEDIC      HX ORTHOPAEDIC      tendon repair    HX WISDOM TEETH EXTRACTION  20 years ago    wisdom teeth          ALLERGIES     Allergies   Allergen Reactions    Amoxicillin Hives    Codeine Nausea and Vomiting    Zomig [Zolmitriptan] Other (comments)     Facial neuralgia and side of head burning, chest tightness and left arm pain          FAMILY HISTORY     Family History Problem Relation Age of Onset    Diabetes Father     High Cholesterol Father     Cancer Father         colon cancer    Other Father         womack's esophagus    Diabetes Sister     Cancer Mother         melenoma    Heart Disease Sister     negative for cardiac disease       SOCIAL HISTORY     Social History     Socioeconomic History    Marital status:      Spouse name: Not on file    Number of children: Not on file    Years of education: Not on file    Highest education level: Not on file   Tobacco Use    Smoking status: Never Smoker    Smokeless tobacco: Never Used   Substance and Sexual Activity    Alcohol use: Yes     Alcohol/week: 0.0 standard drinks     Comment: socially    Drug use: No    Sexual activity: Yes     Partners: Male     Birth control/protection: Surgical     Comment: tubal ligation         MEDICATIONS     Current Outpatient Medications   Medication Sig    famotidine (PEPCID) 20 mg tablet Take 20 mg by mouth two (2) times a day.  clotrimazole-betamethasone (LOTRISONE) topical cream Apply  to affected area two (2) times a day.  loratadine (CLARITIN) 10 mg tablet Take 10 mg by mouth.  fluticasone (FLONASE) 50 mcg/actuation nasal spray 2 Sprays by Both Nostrils route daily.  calcium-cholecalciferol, d3, (CALCIUM 600 + D) 600-125 mg-unit tab Take  by mouth.  multivitamin (ONE A DAY) tablet Take 1 Tab by mouth daily.  cetirizine (ZYRTEC) 10 mg tablet Take  by mouth daily. No current facility-administered medications for this visit. I have reviewed the nurses notes, vitals, problem list, allergy list, medical history, family, social history and medications. REVIEW OF SYMPTOMS      General: Pt denies excessive weight gain or loss. Pt is able to conduct ADL's  HEENT: Denies blurred vision, headaches, hearing loss, epistaxis and difficulty swallowing.   Respiratory: Denies cough, congestion, shortness of breath, SUGGS, wheezing or stridor. Cardiovascular: Denies precordial pain, edema or PND Positive for palpitations  Gastrointestinal: Denies poor appetite, indigestion, abdominal pain or blood in stool  Genitourinary: Denies hematuria, dysuria, increased urinary frequency  Musculoskeletal: Denies joint pain or swelling from muscles or joints  Neurologic: Denies tremor, paresthesias, headache, or sensory motor disturbance  Psychiatric: Denies confusion, insomnia, depression  Integumentray: Denies rash, itching or ulcers. Hematologic: Denies easy bruising, bleeding     PHYSICAL EXAMINATION      Vitals:    12/16/19 0937   BP: 118/80   Pulse: (!) 58   SpO2: 98%   Weight: 237 lb (107.5 kg)   Height: 5' 8\" (1.727 m)     General: Well developed, in no acute distress. HEENT: No jaundice, oral mucosa moist, no oral ulcers  Neck: Supple, no stiffness, no lymphadenopathy, supple  Heart:  slow rate, regular rhythm   Respiratory: Clear bilaterally x 4, no wheezing or rales  Abdomen:   Soft, non-tender, bowel sounds are active. Extremities:  No edema, normal cap refill, no cyanosis. Musculoskeletal: No clubbing, no deformities  Neuro: A&Ox3, speech clear, gait stable, cooperative, no focal neurologic deficits  Skin: Skin color is normal. No rashes or lesions. Non diaphoretic, moist.  Vascular: 2+ pulses symmetric in all extremities        EKG: SB at 47     DIAGNOSTIC DATA     1. Stress test  10/8/2015- normal    2. Echo  10/8/2015- EF 65%    3. Lipids  8/26/16- , HDL 44, ,   8/30/17- , HDL 47, ,   2/28/18- , HDL 44, ,   9/27/18- , HDL 48, LDL 94,   11/19/19- , HDL 46, .8,     4.  Holter  9/24/15- SR HR          LABORATORY DATA            Lab Results   Component Value Date/Time    WBC 6.6 11/19/2019 08:00 AM    HGB 14.7 11/19/2019 08:00 AM    HCT 46.5 11/19/2019 08:00 AM    PLATELET 393 29/72/5768 08:00 AM    MCV 98.3 11/19/2019 08:00 AM      Lab Results Component Value Date/Time    Sodium 139 11/19/2019 08:00 AM    Potassium 4.4 11/19/2019 08:00 AM    Chloride 105 11/19/2019 08:00 AM    CO2 30 11/19/2019 08:00 AM    Anion gap 4 (L) 11/19/2019 08:00 AM    Glucose 98 11/19/2019 08:00 AM    BUN 12 11/19/2019 08:00 AM    Creatinine 0.71 11/19/2019 08:00 AM    BUN/Creatinine ratio 17 11/19/2019 08:00 AM    GFR est AA >60 11/19/2019 08:00 AM    GFR est non-AA >60 11/19/2019 08:00 AM    Calcium 9.1 11/19/2019 08:00 AM    Bilirubin, total 0.5 11/19/2019 08:00 AM    AST (SGOT) 15 11/19/2019 08:00 AM    Alk. phosphatase 86 11/19/2019 08:00 AM    Protein, total 6.8 11/19/2019 08:00 AM    Albumin 3.8 11/19/2019 08:00 AM    Globulin 3.0 11/19/2019 08:00 AM    A-G Ratio 1.3 11/19/2019 08:00 AM    ALT (SGPT) 31 11/19/2019 08:00 AM           ASSESSMENT/RECOMMENDATIONS:.      1. Palpitations   - She notes that her palpitations occur during her menstrual cycle. I favor placing a monitor on her prior to the next cycle and she will call us and let us know when to send her the holter. 2. Dyslipidemia  - Last LDL was elevated at 118. Goal should be 100 or below. I gave her information on calcium scoring. Advised her to reduce her red meat and dairy intake. 3. Bradycardia  - Will once again order a stress test to look for chronotropic competence. In the past she was able to get her HR up appropriately. 4. Return in 6 weeks or PRN. Orders Placed This Encounter    AMB POC EKG ROUTINE W/ 12 LEADS, INTER & REP     Order Specific Question:   Reason for Exam:     Answer:   palps          Follow-up and Dispositions  ·   Return in about 6 months (around 6/16/2020). I have discussed the diagnosis with  Sha Black and the intended plan as seen in the above orders. Questions were answered concerning future plans. I have discussed medication side effects and warnings with the patient as well.     Thank you,  Jared Melton NP for involving me in the care of  Juan Manuel Nagy Spencer. Please do not hesitate to contact me for further questions/concerns. Written by Vicky Doe, as dictated by Katerin Izquierdo MD.     Juvenohemi Dominguez MD, [facility] [place]    Patient Care Team:  Gagandeep Salazar NP as PCP - General (Family Practice)  Gagandeep Salazar NP as PCP - [facility] Empaneled Provider  Brandy Wallace MD (Cardiology)    [address], [address] [place] [address]      [phone] / [phone] Fax

## 2019-12-16 ENCOUNTER — OFFICE VISIT (OUTPATIENT)
Dept: CARDIOLOGY CLINIC | Age: 54
End: 2019-12-16

## 2019-12-16 VITALS
SYSTOLIC BLOOD PRESSURE: 118 MMHG | WEIGHT: 237 LBS | OXYGEN SATURATION: 98 % | HEART RATE: 58 BPM | DIASTOLIC BLOOD PRESSURE: 80 MMHG | BODY MASS INDEX: 35.92 KG/M2 | HEIGHT: 68 IN

## 2019-12-16 DIAGNOSIS — R00.2 PALPITATIONS: Primary | ICD-10-CM

## 2019-12-16 DIAGNOSIS — E78.00 HYPERCHOLESTEREMIA: ICD-10-CM

## 2019-12-16 NOTE — LETTER
12/17/19 Patient: Deja Baer YOB: 1965 Date of Visit: 12/16/2019 Meghana Gaines NP 
[address] [place] [id number] [facility] [address] VIA In Basket Dear Meghana Gaines NP, Thank you for referring Ms. Bonita Palmer to [facility] for evaluation. My notes for this consultation are attached. If you have questions, please do not hesitate to call me. I look forward to following your patient along with you.  
 
 
Sincerely, 
 
Alex Salinas MD

## 2019-12-16 NOTE — PROGRESS NOTES
Chief Complaint   Patient presents with    Palpitations     New Patient Evaluation     Visit Vitals  /80 (BP 1 Location: Right arm, BP Patient Position: Sitting)   Pulse (!) 58   Ht 5' 8\" (1.727 m)   Wt 237 lb (107.5 kg)   SpO2 98%   BMI 36.04 kg/m²     No cardiac complaints  EKG performed

## 2020-02-27 ENCOUNTER — OFFICE VISIT (OUTPATIENT)
Dept: FAMILY MEDICINE CLINIC | Age: 55
End: 2020-02-27

## 2020-02-27 ENCOUNTER — HOSPITAL ENCOUNTER (OUTPATIENT)
Dept: LAB | Age: 55
Discharge: HOME | End: 2020-02-27

## 2020-02-27 VITALS
HEIGHT: 68 IN | OXYGEN SATURATION: 97 % | HEART RATE: 55 BPM | RESPIRATION RATE: 14 BRPM | DIASTOLIC BLOOD PRESSURE: 70 MMHG | TEMPERATURE: 97.6 F | SYSTOLIC BLOOD PRESSURE: 138 MMHG | WEIGHT: 234 LBS | BODY MASS INDEX: 35.46 KG/M2

## 2020-02-27 DIAGNOSIS — E78.00 HYPERCHOLESTEREMIA: Primary | ICD-10-CM

## 2020-02-27 DIAGNOSIS — E78.00 HYPERCHOLESTEREMIA: ICD-10-CM

## 2020-02-27 LAB
CHOLEST SERPL-MCNC: 196 MG/DL
HDLC SERPL-MCNC: 50 MG/DL
HDLC SERPL: 3.9 {RATIO} (ref 0–5)
LDLC SERPL CALC-MCNC: 114.4 MG/DL (ref 0–100)
LIPID PROFILE,FLP: ABNORMAL
TRIGL SERPL-MCNC: 158 MG/DL (ref ?–150)
VLDLC SERPL CALC-MCNC: 31.6 MG/DL

## 2020-02-27 NOTE — PROGRESS NOTES
Chief Complaint   Patient presents with    Labs     Pt in office today for labs    1. Have you been to the ER, urgent care clinic since your last visit? Hospitalized since your last visit? No    2. Have you seen or consulted any other health care providers outside of the [address] [place] [address] since your last visit? Include any pap smears or colon screening.  No     Pt has no other concerns

## 2020-02-27 NOTE — PROGRESS NOTES
HISTORY OF PRESENT ILLNESS  Shonna Garcia is a 62 y.o. female. HPI  Cardiovascular Review:  The patient has hyperlipidemia. Diet and Lifestyle: generally follows a low fat low cholesterol diet, generally follows a low sodium diet, exercises sporadically, nonsmoker  Home BP Monitoring: is not measured at home. Pertinent ROS: taking medications as instructed, no medication side effects noted, no TIA's, no chest pain on exertion, no dyspnea on exertion, no swelling of ankles. Patient Active Problem List    Diagnosis Date Noted    Migraine with aura and without status migrainosus, not intractable 04/24/2018    Severe obesity (BMI 35.0-39. 9) with comorbidity ([facility] [address] 04/24/2018    Hypercholesteremia 02/28/2018    Bradycardia 11/13/2015    Allergic rhinitis 08/16/2011    Impaired fasting blood sugar 08/16/2011     Current Outpatient Medications   Medication Sig Dispense Refill    famotidine (PEPCID) 20 mg tablet Take 20 mg by mouth two (2) times a day.  clotrimazole-betamethasone (LOTRISONE) topical cream Apply  to affected area two (2) times a day. 45 g 1    loratadine (CLARITIN) 10 mg tablet Take 10 mg by mouth.  fluticasone (FLONASE) 50 mcg/actuation nasal spray 2 Sprays by Both Nostrils route daily.  calcium-cholecalciferol, d3, (CALCIUM 600 + D) 600-125 mg-unit tab Take  by mouth.  multivitamin (ONE A DAY) tablet Take 1 Tab by mouth daily.  cetirizine (ZYRTEC) 10 mg tablet Take  by mouth daily. Family History   Problem Relation Age of Onset    Diabetes Father     High Cholesterol Father     Cancer Father         colon cancer    Other Father         womack's esophagus    Diabetes Sister     Cancer Mother         melenoma    Heart Disease Sister      Social History     Tobacco Use    Smoking status: Never Smoker    Smokeless tobacco: Never Used   Substance Use Topics    Alcohol use:  Yes     Alcohol/week: 0.0 standard drinks     Comment: tracy MURPHY comprehensive review of system was obtained and negative except findings in the HPI    Visit Vitals  /70 (BP 1 Location: Left arm, BP Patient Position: Sitting)   Pulse (!) 55   Temp 97.6 °F (36.4 °C) (Oral)   Resp 14   Ht 5' 8\" (1.727 m)   Wt 234 lb (106.1 kg)   SpO2 97%   BMI 35.58 kg/m²     Physical Exam  Vitals signs and nursing note reviewed. Constitutional:       Appearance: She is well-developed. Comments:      Neck:      Vascular: No JVD. Cardiovascular:      Rate and Rhythm: Normal rate and regular rhythm. Heart sounds: No murmur. No friction rub. No gallop. Pulmonary:      Effort: Pulmonary effort is normal. No respiratory distress. Breath sounds: Normal breath sounds. No wheezing. Skin:     General: Skin is warm. Neurological:      Mental Status: She is alert and oriented to person, place, and time. ASSESSMENT and PLAN  Encounter Diagnoses   Name Primary?  Hypercholesteremia Yes     Orders Placed This Encounter    LIPID PANEL     Labs updated today  Follow up 6 mo if stable    I have discussed the diagnosis with the patient and the intended plan as seen in the above orders. The patient has received an after-visit summary and questions were answered concerning future plans. Patient conveyed understanding of the plan at the time of the visit.     Nya Barry, MSN, ANP  2/27/2020

## 2020-03-02 NOTE — PROGRESS NOTES
Hey there, your labs look great for cholesterol, no changes at this time, recheck 6 months, Candida Gutierrez

## 2020-06-05 ENCOUNTER — OFFICE VISIT (OUTPATIENT)
Dept: CARDIOLOGY CLINIC | Age: 55
End: 2020-06-05

## 2020-06-05 DIAGNOSIS — E78.00 HYPERCHOLESTEREMIA: ICD-10-CM

## 2020-06-05 DIAGNOSIS — R00.2 PALPITATIONS: Primary | ICD-10-CM

## 2020-11-30 ENCOUNTER — TELEPHONE (OUTPATIENT)
Dept: FAMILY MEDICINE CLINIC | Age: 55
End: 2020-11-30

## 2020-11-30 RX ORDER — ELETRIPTAN HYDROBROMIDE 40 MG/1
40 TABLET, FILM COATED ORAL
Qty: 12 TAB | Refills: 2 | Status: SHIPPED | OUTPATIENT
Start: 2020-11-30 | End: 2020-11-30

## 2020-12-24 ENCOUNTER — VIRTUAL VISIT (OUTPATIENT)
Dept: FAMILY MEDICINE CLINIC | Age: 55
End: 2020-12-24
Payer: COMMERCIAL

## 2020-12-24 DIAGNOSIS — J01.00 ACUTE MAXILLARY SINUSITIS, RECURRENCE NOT SPECIFIED: Primary | ICD-10-CM

## 2020-12-24 PROCEDURE — 99213 OFFICE O/P EST LOW 20 MIN: CPT | Performed by: NURSE PRACTITIONER

## 2020-12-24 RX ORDER — AZITHROMYCIN 250 MG/1
TABLET, FILM COATED ORAL
Qty: 6 TAB | Refills: 0 | Status: SHIPPED | OUTPATIENT
Start: 2020-12-24 | End: 2020-12-29

## 2020-12-24 NOTE — PROGRESS NOTES
Chapincito Crespo is a 60 y.o. female who was seen by synchronous (real-time) audio-video technology on 12/24/2020 for No chief complaint on file. I spent at least 15 minutes on this visit with this established patient. 712  Subjective:   Patient is having sinus pressure  St, pnd and headache ongoing x 1 week  otc allergy meds and sudafed not helping    Prior to Admission medications    Medication Sig Start Date End Date Taking? Authorizing Provider   azithromycin (ZITHROMAX) 250 mg tablet Take 2 tablets today, then take 1 tablet daily 12/24/20 12/29/20 Yes Gregory Hampton NP   famotidine (PEPCID) 20 mg tablet Take 20 mg by mouth two (2) times a day. Provider, Historical   clotrimazole-betamethasone (LOTRISONE) topical cream Apply  to affected area two (2) times a day. 11/27/18   Gregory Hampton NP   loratadine (CLARITIN) 10 mg tablet Take 10 mg by mouth. Provider, Historical   fluticasone (FLONASE) 50 mcg/actuation nasal spray 2 Sprays by Both Nostrils route daily. Provider, Historical   calcium-cholecalciferol, d3, (CALCIUM 600 + D) 600-125 mg-unit tab Take  by mouth. Provider, Historical   multivitamin (ONE A DAY) tablet Take 1 Tab by mouth daily. Provider, Historical   cetirizine (ZYRTEC) 10 mg tablet Take  by mouth daily. Provider, Historical     Patient Active Problem List    Diagnosis Date Noted    Migraine with aura and without status migrainosus, not intractable 04/24/2018    Severe obesity (BMI 35.0-39. 9) with comorbidity ([facility] [address] 04/24/2018    Hypercholesteremia 02/28/2018    Bradycardia 11/13/2015    Allergic rhinitis 08/16/2011    Impaired fasting blood sugar 08/16/2011       ROS  A comprehensive review of system was obtained and negative except findings in the HPI      Objective:   No flowsheet data found.    General: alert, cooperative, no distress   Mental  status: normal mood, behavior, speech, dress, motor activity, and thought processes, able to follow commands HENT: NCAT   Neck: no visualized mass   Resp: no respiratory distress   Neuro: no gross deficits   Skin: no discoloration or lesions of concern on visible areas   Psychiatric: normal affect, consistent with stated mood, no evidence of hallucinations     Additional exam findings: none    Diagnoses and all orders for this visit:    1. Acute maxillary sinusitis, recurrence not specified    Other orders  -     azithromycin (ZITHROMAX) 250 mg tablet; Take 2 tablets today, then take 1 tablet daily      Reveiwed adr/se of medication  Push fluids, rest, suggested mucinex for congestion and drainage  Recheck 5-7 days if sx not improved. We discussed the expected course, resolution and complications of the diagnosis(es) in detail. Medication risks, benefits, costs, interactions, and alternatives were discussed as indicated. I advised her to contact the office if her condition worsens, changes or fails to improve as anticipated. She expressed understanding with the diagnosis(es) and plan. Kathy Lopez, who was evaluated through a patient-initiated, synchronous (real-time) audio-video encounter, and/or her healthcare decision maker, is aware that it is a billable service, with coverage as determined by her insurance carrier. She provided verbal consent to proceed: Yes, and patient identification was verified. It was conducted pursuant to the emergency declaration under the [address] [place] [address] authority and the [employer] Act. A caregiver was present when appropriate. Ability to conduct physical exam was limited. I was in the office. The patient was at home.       Aurea Patino NP

## 2021-12-06 ENCOUNTER — OFFICE VISIT (OUTPATIENT)
Dept: FAMILY MEDICINE CLINIC | Age: 56
End: 2021-12-06
Payer: COMMERCIAL

## 2021-12-06 VITALS
TEMPERATURE: 98.1 F | WEIGHT: 235 LBS | SYSTOLIC BLOOD PRESSURE: 112 MMHG | DIASTOLIC BLOOD PRESSURE: 67 MMHG | RESPIRATION RATE: 14 BRPM | HEART RATE: 59 BPM | OXYGEN SATURATION: 98 % | HEIGHT: 68 IN | BODY MASS INDEX: 35.61 KG/M2

## 2021-12-06 DIAGNOSIS — Z00.00 WELL ADULT EXAM: ICD-10-CM

## 2021-12-06 DIAGNOSIS — E78.00 HYPERCHOLESTEREMIA: ICD-10-CM

## 2021-12-06 DIAGNOSIS — J32.9 CHRONIC SINUSITIS, UNSPECIFIED LOCATION: Primary | ICD-10-CM

## 2021-12-06 PROCEDURE — 99396 PREV VISIT EST AGE 40-64: CPT | Performed by: NURSE PRACTITIONER

## 2021-12-06 RX ORDER — ELETRIPTAN HYDROBROMIDE 40 MG/1
40 TABLET, FILM COATED ORAL
Qty: 12 TABLET | Refills: 3 | Status: SHIPPED | OUTPATIENT
Start: 2021-12-06 | End: 2021-12-06

## 2021-12-06 RX ORDER — BACLOFEN 20 MG
TABLET ORAL
COMMUNITY
End: 2022-08-25 | Stop reason: ALTCHOICE

## 2021-12-06 RX ORDER — FISH OIL/DHA/EPA 1200-144MG
CAPSULE ORAL
COMMUNITY
End: 2022-08-25 | Stop reason: ALTCHOICE

## 2021-12-06 RX ORDER — LANOLIN ALCOHOL/MO/W.PET/CERES
1000 CREAM (GRAM) TOPICAL DAILY
COMMUNITY

## 2021-12-06 NOTE — PROGRESS NOTES
Subjective:   60 y.o. female for Well Woman Check. Her gyne and breast care is done elsewhere by her Ob-Gyne physician. Patient Active Problem List    Diagnosis Date Noted    Migraine with aura and without status migrainosus, not intractable 04/24/2018    Severe obesity (BMI 35.0-39. 9) with comorbidity ([facility] [address] 04/24/2018    Hypercholesteremia 02/28/2018    Bradycardia 11/13/2015    Allergic rhinitis 08/16/2011    Impaired fasting blood sugar 08/16/2011     Current Outpatient Medications   Medication Sig Dispense Refill    OTHER,NON-FORMULARY, Tumeric with black pepper      cyanocobalamin 1,000 mcg tablet Take 1,000 mcg by mouth daily.  magnesium oxide 500 mg tab Take  by mouth.  fish oil-dha-epa 1,200-144-216 mg cap Take  by mouth.  eletriptan (Relpax) 40 mg tablet Take 1 Tablet by mouth once as needed (migraine headache) for up to 1 dose. may repeat in 2 hours if necessary 12 Tablet 3    loratadine (CLARITIN) 10 mg tablet Take 10 mg by mouth.  fluticasone (FLONASE) 50 mcg/actuation nasal spray 2 Sprays by Both Nostrils route daily.  calcium-cholecalciferol, d3, (CALCIUM 600 + D) 600-125 mg-unit tab Take  by mouth.  multivitamin (ONE A DAY) tablet Take 1 Tab by mouth daily. Family History   Problem Relation Age of Onset    Diabetes Father     High Cholesterol Father     Cancer Father         colon cancer    Other Father         womack's esophagus    Diabetes Sister     Cancer Mother         melenoma    Heart Disease Sister      Social History     Tobacco Use    Smoking status: Never Smoker    Smokeless tobacco: Never Used   Substance Use Topics    Alcohol use: Yes     Alcohol/week: 0.0 standard drinks     Comment: socially             ROS: Feeling generally well. No TIA's or unusual headaches, no dysphagia. No prolonged cough. No dyspnea or chest pain on exertion. No abdominal pain, change in bowel habits, black or bloody stools.   No urinary tract symptoms. No new or unusual musculoskeletal symptoms. Specific concerns today: none. Objective: The patient appears well, alert, oriented x 3, in no distress. Visit Vitals  /67 (BP 1 Location: Left upper arm, BP Patient Position: Sitting)   Pulse (!) 59   Temp 98.1 °F (36.7 °C) (Oral)   Resp 14   Ht 5' 8\" (1.727 m)   Wt 235 lb (106.6 kg)   SpO2 98%   BMI 35.73 kg/m²     ENT normal.  Neck supple. No adenopathy or thyromegaly. MICHAELA. Lungs are clear, good air entry, no wheezes, rhonchi or rales. S1 and S2 normal, no murmurs, regular rate and rhythm. Abdomen soft without tenderness, guarding, mass or organomegaly. Extremities show no edema, normal peripheral pulses. Neurological is normal, no focal findings. Breast and Pelvic exams are deferred. Assessment/Plan:   Well Woman  lose weight, increase physical activity, follow low fat diet, follow low salt diet, routine labs ordered  Encounter Diagnoses   Name Primary?  Chronic sinusitis, unspecified location Yes    Well adult exam     Hypercholesteremia      Orders Placed This Encounter    CBC WITH AUTOMATED DIFF    METABOLIC PANEL, COMPREHENSIVE    TSH 3RD GENERATION    LIPID PANEL    REFERRAL TO ENT-OTOLARYNGOLOGY    OTHER,NON-FORMULARY,    cyanocobalamin 1,000 mcg tablet    magnesium oxide 500 mg tab    fish oil-dha-epa 1,200-144-216 mg cap    eletriptan (Relpax) 40 mg tablet     Labs and refills updated  Referral given for chronic sinus congestion and frontal HA  Follow up 6 mo if stable  I have discussed the diagnosis with the patient and the intended plan as seen in the above orders. The patient has received an after-visit summary and questions were answered concerning future plans. Patient conveyed understanding of the plan at the time of the visit.     Fely Marcus, MSN, ANP  12/6/2021

## 2021-12-06 NOTE — PROGRESS NOTES
Chief Complaint   Patient presents with    Complete Physical    Labs     Pt being seen for physical   -labs    1. Have you been to the ER, urgent care clinic since your last visit? Hospitalized since your last visit? No    2. Have you seen or consulted any other health care providers outside of the [address] [place] [address] since your last visit? Include any pap smears or colon screening.  No     Pt has no other concerns

## 2021-12-07 LAB
ALBUMIN SERPL-MCNC: 4 G/DL (ref 3.8–4.9)
ALBUMIN/GLOB SERPL: 1.5 {RATIO} (ref 1.2–2.2)
ALP SERPL-CCNC: 90 IU/L (ref 44–121)
ALT SERPL-CCNC: 33 IU/L (ref 0–32)
AST SERPL-CCNC: 24 IU/L (ref 0–40)
BASOPHILS # BLD AUTO: 0.1 X10E3/UL (ref 0–0.2)
BASOPHILS NFR BLD AUTO: 1 %
BILIRUB SERPL-MCNC: 0.5 MG/DL (ref 0–1.2)
BUN SERPL-MCNC: 10 MG/DL (ref 6–24)
BUN/CREAT SERPL: 14 (ref 9–23)
CALCIUM SERPL-MCNC: 9.3 MG/DL (ref 8.7–10.2)
CHLORIDE SERPL-SCNC: 103 MMOL/L (ref 96–106)
CHOLEST SERPL-MCNC: 188 MG/DL (ref 100–199)
CO2 SERPL-SCNC: 25 MMOL/L (ref 20–29)
CREAT SERPL-MCNC: 0.73 MG/DL (ref 0.57–1)
EOSINOPHIL # BLD AUTO: 0.1 X10E3/UL (ref 0–0.4)
EOSINOPHIL NFR BLD AUTO: 2 %
ERYTHROCYTE [DISTWIDTH] IN BLOOD BY AUTOMATED COUNT: 12 % (ref 11.7–15.4)
GLOBULIN SER CALC-MCNC: 2.7 G/DL (ref 1.5–4.5)
GLUCOSE SERPL-MCNC: 108 MG/DL (ref 65–99)
HCT VFR BLD AUTO: 44.3 % (ref 34–46.6)
HDLC SERPL-MCNC: 52 MG/DL
HGB BLD-MCNC: 14.8 G/DL (ref 11.1–15.9)
IMM GRANULOCYTES # BLD AUTO: 0 X10E3/UL (ref 0–0.1)
IMM GRANULOCYTES NFR BLD AUTO: 0 %
IMP & REVIEW OF LAB RESULTS: NORMAL
LDLC SERPL CALC-MCNC: 108 MG/DL (ref 0–99)
LYMPHOCYTES # BLD AUTO: 1.9 X10E3/UL (ref 0.7–3.1)
LYMPHOCYTES NFR BLD AUTO: 33 %
MCH RBC QN AUTO: 30.5 PG (ref 26.6–33)
MCHC RBC AUTO-ENTMCNC: 33.4 G/DL (ref 31.5–35.7)
MCV RBC AUTO: 91 FL (ref 79–97)
MONOCYTES # BLD AUTO: 0.4 X10E3/UL (ref 0.1–0.9)
MONOCYTES NFR BLD AUTO: 8 %
NEUTROPHILS # BLD AUTO: 3.2 X10E3/UL (ref 1.4–7)
NEUTROPHILS NFR BLD AUTO: 56 %
PLATELET # BLD AUTO: 236 X10E3/UL (ref 150–450)
POTASSIUM SERPL-SCNC: 4.4 MMOL/L (ref 3.5–5.2)
PROT SERPL-MCNC: 6.7 G/DL (ref 6–8.5)
RBC # BLD AUTO: 4.85 X10E6/UL (ref 3.77–5.28)
SODIUM SERPL-SCNC: 143 MMOL/L (ref 134–144)
TRIGL SERPL-MCNC: 159 MG/DL (ref 0–149)
TSH SERPL DL<=0.005 MIU/L-ACNC: 2.42 UIU/ML (ref 0.45–4.5)
VLDLC SERPL CALC-MCNC: 28 MG/DL (ref 5–40)
WBC # BLD AUTO: 5.7 X10E3/UL (ref 3.4–10.8)

## 2021-12-13 NOTE — PROGRESS NOTES
Your labs are stable for cholesterol, blood count, sugar and kidney/liver functions. Watch the diet for red meat/pork, dairy products, and fried/fast foods. Recheck 6 months fasting.  Roxanne Anne

## 2022-03-19 PROBLEM — E66.01 SEVERE OBESITY (BMI 35.0-39.9) WITH COMORBIDITY (HCC): Status: ACTIVE | Noted: 2018-04-24

## 2022-03-19 PROBLEM — E78.00 HYPERCHOLESTEREMIA: Status: ACTIVE | Noted: 2018-02-28

## 2022-03-19 PROBLEM — G43.109 MIGRAINE WITH AURA AND WITHOUT STATUS MIGRAINOSUS, NOT INTRACTABLE: Status: ACTIVE | Noted: 2018-04-24

## 2022-05-03 ENCOUNTER — NURSE TRIAGE (OUTPATIENT)
Dept: OTHER | Facility: CLINIC | Age: 57
End: 2022-05-03

## 2022-05-03 NOTE — TELEPHONE ENCOUNTER
Received call from Caty Puckett at [facility] with Red Flag Complaint. Subjective: Caller states \"nausea, fatigue, headache\"     Current Symptoms: nausea, fatigue    Onset: 6 weeks ago; worsening    Associated Symptoms: none    Pain Severity: denies      Temperature: denies    What has been tried: probiotics, nasal spray, zyrtec    LMP: NA Pregnant: NA    Recommended disposition: Go to [facility] for further evaluation of fatigue. Care advice provided, patient verbalizes understanding; denies any other questions or concerns; instructed to call back for any new or worsening symptoms. Patient/Caller agrees with recommended disposition; writer provided warm transfer to [place] at [facility] for appointment scheduling    Attention Provider: Thank you for allowing me to participate in the care of your patient. The patient was connected to triage in response to information provided to the [facility]. Please do not respond through this encounter as the response is not directed to a shared pool.       Reason for Disposition   MODERATE weakness (i.e., interferes with work, school, normal activities) and cause unknown (Exceptions: weakness with acute minor illness, or weakness from poor fluid intake)    Protocols used: WEAKNESS (GENERALIZED) AND FATIGUE-ADULT-[place]

## 2022-08-11 ENCOUNTER — TRANSCRIBE ORDER (OUTPATIENT)
Dept: SCHEDULING | Age: 57
End: 2022-08-11

## 2022-08-11 DIAGNOSIS — M54.6 THORACIC SPINE PAIN: Primary | ICD-10-CM

## 2022-08-23 ENCOUNTER — HOSPITAL ENCOUNTER (OUTPATIENT)
Dept: MRI IMAGING | Age: 57
Discharge: HOME | End: 2022-08-23
Attending: ORTHOPAEDIC SURGERY
Payer: COMMERCIAL

## 2022-08-23 DIAGNOSIS — M54.6 THORACIC SPINE PAIN: ICD-10-CM

## 2022-08-23 PROCEDURE — 72146 MRI CHEST SPINE W/O DYE: CPT

## 2022-08-25 ENCOUNTER — OFFICE VISIT (OUTPATIENT)
Dept: FAMILY MEDICINE CLINIC | Age: 57
End: 2022-08-25
Payer: COMMERCIAL

## 2022-08-25 VITALS
SYSTOLIC BLOOD PRESSURE: 106 MMHG | DIASTOLIC BLOOD PRESSURE: 71 MMHG | RESPIRATION RATE: 18 BRPM | HEART RATE: 57 BPM | BODY MASS INDEX: 35.46 KG/M2 | OXYGEN SATURATION: 95 % | TEMPERATURE: 98.2 F | HEIGHT: 68 IN | WEIGHT: 234 LBS

## 2022-08-25 DIAGNOSIS — R73.01 IMPAIRED FASTING BLOOD SUGAR: ICD-10-CM

## 2022-08-25 DIAGNOSIS — R03.0 ELEVATED BLOOD PRESSURE READING: ICD-10-CM

## 2022-08-25 DIAGNOSIS — E78.00 HYPERCHOLESTEREMIA: Primary | ICD-10-CM

## 2022-08-25 DIAGNOSIS — E66.01 SEVERE OBESITY (BMI 35.0-39.9) WITH COMORBIDITY (HCC): ICD-10-CM

## 2022-08-25 DIAGNOSIS — F41.9 ANXIETY: ICD-10-CM

## 2022-08-25 LAB
COMMENT, HOLDF: NORMAL
SAMPLES BEING HELD,HOLD: NORMAL

## 2022-08-25 PROCEDURE — 99214 OFFICE O/P EST MOD 30 MIN: CPT | Performed by: NURSE PRACTITIONER

## 2022-08-25 RX ORDER — ELETRIPTAN HYDROBROMIDE 40 MG/1
40 TABLET, FILM COATED ORAL
Qty: 6 TABLET | Refills: 12 | Status: SHIPPED | OUTPATIENT
Start: 2022-08-25 | End: 2022-08-25

## 2022-08-25 RX ORDER — ESCITALOPRAM OXALATE 5 MG/1
5 TABLET ORAL DAILY
Qty: 30 TABLET | Refills: 5 | Status: SHIPPED | OUTPATIENT
Start: 2022-08-25

## 2022-08-25 NOTE — PROGRESS NOTES
HISTORY OF PRESENT ILLNESS  Myriam Akbar is a 59 y.o. female. HPI  Patient is here for follow up high cholesterol  Trying to watch diet for low fat  She is extremely stressed, dealing with family and parents both  this spring  Willing to try med to help  She also notes that she has had a high sugar in the past and would like to get checked for diabetes. She is also had episodes of elevated blood pressure to the point where she went to the ER last month and had a full cardiac work-up which was negative for extremely high blood pressure, most likely stress-induced    ROS  A comprehensive review of system was obtained and negative except findings in the HPI    Visit Vitals  /71 (BP 1 Location: Left upper arm, BP Patient Position: Sitting)   Pulse (!) 57   Temp 98.2 °F (36.8 °C) (Oral)   Resp 18   Ht 5' 8\" (1.727 m)   Wt 234 lb (106.1 kg)   SpO2 95%   BMI 35.58 kg/m²     Physical Exam  Vitals and nursing note reviewed. Constitutional:       Appearance: Normal appearance. She is well-developed. Comments:      Neck:      Vascular: No JVD. Cardiovascular:      Rate and Rhythm: Normal rate and regular rhythm. Heart sounds: Normal heart sounds. No murmur heard. No friction rub. No gallop. Pulmonary:      Effort: Pulmonary effort is normal. No respiratory distress. Breath sounds: Normal breath sounds. No wheezing. Skin:     General: Skin is warm. Neurological:      Mental Status: She is alert and oriented to person, place, and time. ASSESSMENT and PLAN  Encounter Diagnoses   Name Primary? Hypercholesteremia Yes    Anxiety     Elevated blood pressure reading     Impaired fasting blood sugar     Severe obesity (BMI 35.0-39. 9) with comorbidity ([address]      Orders Placed This Encounter    CBC WITH AUTOMATED DIFF    METABOLIC PANEL, COMPREHENSIVE    HEMOGLOBIN A1C WITH EAG    LIPID PANEL    escitalopram oxalate (LEXAPRO) 5 mg tablet    eletriptan (Relpax) 40 mg tablet     Labs updated today  Start lexapro 5mg, reviewed how to use in the dosing. She will do a 3-week follow-up for mood improvement. I did also refill her Relpax for migraines. I have discussed the diagnosis with the patient and the intended plan as seen in the above orders. The patient has received an after-visit summary and questions were answered concerning future plans. Patient conveyed understanding of the plan at the time of the visit.     Adilia Farr, SANDRA, ANP  8/25/2022

## 2022-08-25 NOTE — PROGRESS NOTES
Chief Complaint   Patient presents with    Follow-up    Labs     Pt being seen for fuv  -labs    1. Have you been to the ER, urgent care clinic since your last visit? Hospitalized since your last visit? No    2. Have you seen or consulted any other health care providers outside of the [address] [place] [address] since your last visit? Include any pap smears or colon screening.  No    Pt has no other concerns

## 2022-08-26 LAB
ALBUMIN SERPL-MCNC: 4 G/DL (ref 3.5–5)
ALBUMIN/GLOB SERPL: 1.4 {RATIO} (ref 1.1–2.2)
ALP SERPL-CCNC: 95 U/L (ref 45–117)
ALT SERPL-CCNC: 35 U/L (ref 12–78)
ANION GAP SERPL CALC-SCNC: 6 MMOL/L (ref 5–15)
AST SERPL-CCNC: 17 U/L (ref 15–37)
BASOPHILS # BLD: 0.1 K/UL (ref 0–0.1)
BASOPHILS NFR BLD: 1 % (ref 0–1)
BILIRUB SERPL-MCNC: 0.5 MG/DL (ref 0.2–1)
BUN SERPL-MCNC: 12 MG/DL (ref 6–20)
BUN/CREAT SERPL: 18 (ref 12–20)
CALCIUM SERPL-MCNC: 9.4 MG/DL (ref 8.5–10.1)
CHLORIDE SERPL-SCNC: 103 MMOL/L (ref 97–108)
CHOLEST SERPL-MCNC: 194 MG/DL
CO2 SERPL-SCNC: 29 MMOL/L (ref 21–32)
CREAT SERPL-MCNC: 0.67 MG/DL (ref 0.55–1.02)
DIFFERENTIAL METHOD BLD: ABNORMAL
EOSINOPHIL # BLD: 0.1 K/UL (ref 0–0.4)
EOSINOPHIL NFR BLD: 2 % (ref 0–7)
ERYTHROCYTE [DISTWIDTH] IN BLOOD BY AUTOMATED COUNT: 13 % (ref 11.5–14.5)
EST. AVERAGE GLUCOSE BLD GHB EST-MCNC: 128 MG/DL
GLOBULIN SER CALC-MCNC: 2.9 G/DL (ref 2–4)
GLUCOSE SERPL-MCNC: 99 MG/DL (ref 65–100)
HBA1C MFR BLD: 6.1 % (ref 4–5.6)
HCT VFR BLD AUTO: 48.3 % (ref 35–47)
HDLC SERPL-MCNC: 53 MG/DL
HDLC SERPL: 3.7 {RATIO} (ref 0–5)
HGB BLD-MCNC: 15.2 G/DL (ref 11.5–16)
IMM GRANULOCYTES # BLD AUTO: 0 K/UL (ref 0–0.04)
IMM GRANULOCYTES NFR BLD AUTO: 0 % (ref 0–0.5)
LDLC SERPL CALC-MCNC: 112.6 MG/DL (ref 0–100)
LYMPHOCYTES # BLD: 2 K/UL (ref 0.8–3.5)
LYMPHOCYTES NFR BLD: 26 % (ref 12–49)
MCH RBC QN AUTO: 31.1 PG (ref 26–34)
MCHC RBC AUTO-ENTMCNC: 31.5 G/DL (ref 30–36.5)
MCV RBC AUTO: 98.8 FL (ref 80–99)
MONOCYTES # BLD: 0.5 K/UL (ref 0–1)
MONOCYTES NFR BLD: 7 % (ref 5–13)
NEUTS SEG # BLD: 4.7 K/UL (ref 1.8–8)
NEUTS SEG NFR BLD: 64 % (ref 32–75)
NRBC # BLD: 0 K/UL (ref 0–0.01)
NRBC BLD-RTO: 0 PER 100 WBC
PLATELET # BLD AUTO: 249 K/UL (ref 150–400)
PMV BLD AUTO: 10.5 FL (ref 8.9–12.9)
POTASSIUM SERPL-SCNC: 4.5 MMOL/L (ref 3.5–5.1)
PROT SERPL-MCNC: 6.9 G/DL (ref 6.4–8.2)
RBC # BLD AUTO: 4.89 M/UL (ref 3.8–5.2)
SODIUM SERPL-SCNC: 138 MMOL/L (ref 136–145)
TRIGL SERPL-MCNC: 142 MG/DL (ref ?–150)
VLDLC SERPL CALC-MCNC: 28.4 MG/DL
WBC # BLD AUTO: 7.4 K/UL (ref 3.6–11)

## 2022-09-08 ENCOUNTER — DOCUMENTATION ONLY (OUTPATIENT)
Dept: FAMILY MEDICINE CLINIC | Age: 57
End: 2022-09-08

## 2022-09-14 ENCOUNTER — TRANSCRIBE ORDER (OUTPATIENT)
Dept: SCHEDULING | Age: 57
End: 2022-09-14

## 2022-09-14 DIAGNOSIS — M46.20 SPINAL ABSCESS (HCC): Primary | ICD-10-CM

## 2022-09-15 ENCOUNTER — TRANSCRIBE ORDER (OUTPATIENT)
Dept: SCHEDULING | Age: 57
End: 2022-09-15

## 2022-09-15 DIAGNOSIS — M46.20 SPINAL ABSCESS (HCC): Primary | ICD-10-CM

## 2022-09-16 ENCOUNTER — HOSPITAL ENCOUNTER (OUTPATIENT)
Dept: NUCLEAR MEDICINE | Age: 57
Discharge: HOME | End: 2022-09-16
Attending: ORTHOPAEDIC SURGERY
Payer: COMMERCIAL

## 2022-09-16 DIAGNOSIS — M46.20 SPINAL ABSCESS (HCC): ICD-10-CM

## 2022-09-16 PROCEDURE — 78803 RP LOCLZJ TUM SPECT 1 AREA: CPT

## 2022-09-16 PROCEDURE — 78306 BONE IMAGING WHOLE BODY: CPT

## 2023-01-18 ENCOUNTER — TELEPHONE (OUTPATIENT)
Dept: FAMILY MEDICINE CLINIC | Age: 58
End: 2023-01-18

## 2023-01-18 RX ORDER — ELETRIPTAN HYDROBROMIDE 40 MG/1
40 TABLET, FILM COATED ORAL
Qty: 12 TABLET | Refills: 5 | Status: SHIPPED | OUTPATIENT
Start: 2023-01-18 | End: 2023-01-18

## 2023-04-21 DIAGNOSIS — M46.20 SPINAL ABSCESS (HCC): Primary | ICD-10-CM

## 2023-05-26 RX ORDER — FLUTICASONE PROPIONATE 50 MCG
2 SPRAY, SUSPENSION (ML) NASAL DAILY
COMMUNITY

## 2023-05-26 RX ORDER — LORATADINE 10 MG/1
10 TABLET ORAL
COMMUNITY

## 2023-05-26 RX ORDER — ESCITALOPRAM OXALATE 5 MG/1
5 TABLET ORAL DAILY
COMMUNITY
Start: 2022-08-25

## 2023-11-20 ENCOUNTER — CLINICAL DOCUMENTATION (OUTPATIENT)
Age: 58
End: 2023-11-20

## 2023-12-27 ENCOUNTER — HOSPITAL ENCOUNTER (OUTPATIENT)
Age: 58
Discharge: HOME | End: 2023-12-30
Payer: COMMERCIAL

## 2023-12-27 DIAGNOSIS — M22.42 CHONDROMALACIA OF LEFT PATELLOFEMORAL JOINT: ICD-10-CM

## 2023-12-27 DIAGNOSIS — G89.29 CHRONIC PAIN OF LEFT KNEE: ICD-10-CM

## 2023-12-27 DIAGNOSIS — M25.562 CHRONIC PAIN OF LEFT KNEE: ICD-10-CM

## 2023-12-27 PROCEDURE — 73721 MRI JNT OF LWR EXTRE W/O DYE: CPT

## 2024-02-03 RX ORDER — ELETRIPTAN HYDROBROMIDE 40 MG/1
TABLET, FILM COATED ORAL
Qty: 12 TABLET | Refills: 5 | Status: SHIPPED | OUTPATIENT
Start: 2024-02-03

## 2024-03-26 ENCOUNTER — OFFICE VISIT (OUTPATIENT)
Age: 59
End: 2024-03-26
Payer: COMMERCIAL

## 2024-03-26 VITALS
WEIGHT: 209.4 LBS | TEMPERATURE: 98.1 F | HEIGHT: 68 IN | BODY MASS INDEX: 31.74 KG/M2 | SYSTOLIC BLOOD PRESSURE: 108 MMHG | DIASTOLIC BLOOD PRESSURE: 75 MMHG | OXYGEN SATURATION: 97 % | HEART RATE: 76 BPM | RESPIRATION RATE: 18 BRPM

## 2024-03-26 DIAGNOSIS — L85.3 DRY SKIN: Primary | ICD-10-CM

## 2024-03-26 DIAGNOSIS — R73.01 IMPAIRED FASTING GLUCOSE: ICD-10-CM

## 2024-03-26 PROCEDURE — 99213 OFFICE O/P EST LOW 20 MIN: CPT | Performed by: NURSE PRACTITIONER

## 2024-03-26 RX ORDER — HYDROCHLOROTHIAZIDE 12.5 MG/1
12.5 TABLET ORAL DAILY
COMMUNITY

## 2024-03-26 SDOH — ECONOMIC STABILITY: INCOME INSECURITY: HOW HARD IS IT FOR YOU TO PAY FOR THE VERY BASICS LIKE FOOD, HOUSING, MEDICAL CARE, AND HEATING?: NOT VERY HARD

## 2024-03-26 SDOH — ECONOMIC STABILITY: FOOD INSECURITY: WITHIN THE PAST 12 MONTHS, THE FOOD YOU BOUGHT JUST DIDN'T LAST AND YOU DIDN'T HAVE MONEY TO GET MORE.: NEVER TRUE

## 2024-03-26 SDOH — ECONOMIC STABILITY: FOOD INSECURITY: WITHIN THE PAST 12 MONTHS, YOU WORRIED THAT YOUR FOOD WOULD RUN OUT BEFORE YOU GOT MONEY TO BUY MORE.: NEVER TRUE

## 2024-03-26 SDOH — ECONOMIC STABILITY: HOUSING INSECURITY
IN THE LAST 12 MONTHS, WAS THERE A TIME WHEN YOU DID NOT HAVE A STEADY PLACE TO SLEEP OR SLEPT IN A SHELTER (INCLUDING NOW)?: NO

## 2024-03-26 SDOH — ECONOMIC STABILITY: INCOME INSECURITY: HOW HARD IS IT FOR YOU TO PAY FOR THE VERY BASICS LIKE FOOD, HOUSING, MEDICAL CARE, AND HEATING?: NOT HARD AT ALL

## 2024-03-26 ASSESSMENT — PATIENT HEALTH QUESTIONNAIRE - PHQ9
SUM OF ALL RESPONSES TO PHQ QUESTIONS 1-9: 0
SUM OF ALL RESPONSES TO PHQ QUESTIONS 1-9: 0
SUM OF ALL RESPONSES TO PHQ9 QUESTIONS 1 & 2: 0
SUM OF ALL RESPONSES TO PHQ QUESTIONS 1-9: 0
1. LITTLE INTEREST OR PLEASURE IN DOING THINGS: NOT AT ALL
2. FEELING DOWN, DEPRESSED OR HOPELESS: NOT AT ALL
SUM OF ALL RESPONSES TO PHQ QUESTIONS 1-9: 0

## 2024-03-26 NOTE — PROGRESS NOTES
Chief Complaint   Patient presents with    Follow-up     Patient is in the office today for f/u.  Patient states she got a new diagnosis of diabetes.  Patient has thyroid concerns.  No other concerns.      \"Have you been to the ER, urgent care clinic since your last visit?  Hospitalized since your last visit?\"    NO    “Have you seen or consulted any other health care providers outside of [facility] since your last visit?”    NO     “Have you had a pap smear?”    NO                 Click Here for Release of Records Request

## 2024-03-27 LAB
EST. AVERAGE GLUCOSE BLD GHB EST-MCNC: 126 MG/DL
HBA1C MFR BLD: 6 % (ref 4–5.6)
TSH SERPL DL<=0.05 MIU/L-ACNC: 1.65 UIU/ML (ref 0.36–3.74)

## 2024-03-27 NOTE — PROGRESS NOTES
Subjective:      Patient ID: Dunia Oviedo is a 58 y.o. female.    HPI  Recent visit with specialist shows that her A1c climbed to 6.5 in Nov  Would like checked again.   Down 25 pounds with diet/exercise  Has gone to all high fiber, lower carb diet    Having chronic dry skin and would like to have thyroid recheck  Also fatigued but actively moving into new house    Review of Systems  A comprehensive review of system was obtained and negative except findings in the HPI    /75 (Site: Right Upper Arm, Position: Sitting)   Pulse 76   Temp 98.1 °F (36.7 °C) (Oral)   Resp 18   Ht 1.727 m (5' 8\")   Wt 95 kg (209 lb 6.4 oz)   SpO2 97%   BMI 31.84 kg/m²   Objective:   Physical Exam  Vitals and nursing note reviewed.   Constitutional:       General: She is not in acute distress.     Appearance: Normal appearance.   Cardiovascular:      Rate and Rhythm: Normal rate and regular rhythm.   Pulmonary:      Effort: Pulmonary effort is normal. No respiratory distress.      Breath sounds: Normal breath sounds. No wheezing or rhonchi.   Musculoskeletal:         General: No swelling.   Neurological:      General: No focal deficit present.      Mental Status: She is alert and oriented to person, place, and time.   Psychiatric:         Mood and Affect: Mood normal.         Assessment / Plan:   Dunia was seen today for follow-up.    Diagnoses and all orders for this visit:    Dry skin  -     TSH; Future  -     TSH    Impaired fasting glucose  -     Hemoglobin A1C; Future  -     Hemoglobin A1C      Labs updated today  Reviewed dm diet  Follow up pending report  I have discussed the diagnosis with the patient and the intended plan as seen in the above orders. The patient has received an after-visit summary and questions were answered concerning future plans. Patient conveyed understanding of the plan at the time of the visit.    Heather Day, MSN, ANP  3/27/2024

## 2024-09-26 ENCOUNTER — OFFICE VISIT (OUTPATIENT)
Age: 59
End: 2024-09-26

## 2024-09-26 VITALS
HEART RATE: 48 BPM | WEIGHT: 208 LBS | SYSTOLIC BLOOD PRESSURE: 110 MMHG | BODY MASS INDEX: 31.52 KG/M2 | RESPIRATION RATE: 19 BRPM | TEMPERATURE: 98.1 F | HEIGHT: 68 IN | OXYGEN SATURATION: 96 % | DIASTOLIC BLOOD PRESSURE: 71 MMHG

## 2024-09-26 DIAGNOSIS — R73.01 IMPAIRED FASTING GLUCOSE: ICD-10-CM

## 2024-09-26 DIAGNOSIS — E78.00 HYPERCHOLESTEREMIA: ICD-10-CM

## 2024-09-26 DIAGNOSIS — G43.109 MIGRAINE WITH AURA AND WITHOUT STATUS MIGRAINOSUS, NOT INTRACTABLE: ICD-10-CM

## 2024-09-26 DIAGNOSIS — Z12.31 SCREENING MAMMOGRAM FOR BREAST CANCER: ICD-10-CM

## 2024-09-26 DIAGNOSIS — J30.1 ALLERGIC RHINITIS DUE TO POLLEN, UNSPECIFIED SEASONALITY: ICD-10-CM

## 2024-09-26 DIAGNOSIS — Z00.00 WELL EXAM WITHOUT ABNORMAL FINDINGS OF PATIENT 18 YEARS OF AGE OR OLDER: Primary | ICD-10-CM

## 2024-09-26 LAB
ALBUMIN SERPL-MCNC: 3.7 G/DL (ref 3.5–5)
ALBUMIN/GLOB SERPL: 1.3 (ref 1.1–2.2)
ALP SERPL-CCNC: 71 U/L (ref 45–117)
ALT SERPL-CCNC: 29 U/L (ref 12–78)
ANION GAP SERPL CALC-SCNC: 2 MMOL/L (ref 2–12)
AST SERPL-CCNC: 25 U/L (ref 15–37)
BASOPHILS # BLD: 0.1 K/UL (ref 0–0.1)
BASOPHILS NFR BLD: 1 % (ref 0–1)
BILIRUB SERPL-MCNC: 0.5 MG/DL (ref 0.2–1)
BUN SERPL-MCNC: 12 MG/DL (ref 6–20)
BUN/CREAT SERPL: 19 (ref 12–20)
CALCIUM SERPL-MCNC: 9.5 MG/DL (ref 8.5–10.1)
CHLORIDE SERPL-SCNC: 102 MMOL/L (ref 97–108)
CHOLEST SERPL-MCNC: 183 MG/DL
CO2 SERPL-SCNC: 33 MMOL/L (ref 21–32)
CREAT SERPL-MCNC: 0.63 MG/DL (ref 0.55–1.02)
CREAT UR-MCNC: 28.3 MG/DL
DIFFERENTIAL METHOD BLD: ABNORMAL
EOSINOPHIL # BLD: 0.1 K/UL (ref 0–0.4)
EOSINOPHIL NFR BLD: 2 % (ref 0–7)
ERYTHROCYTE [DISTWIDTH] IN BLOOD BY AUTOMATED COUNT: 13 % (ref 11.5–14.5)
EST. AVERAGE GLUCOSE BLD GHB EST-MCNC: 126 MG/DL
GLOBULIN SER CALC-MCNC: 2.9 G/DL (ref 2–4)
GLUCOSE SERPL-MCNC: 103 MG/DL (ref 65–100)
HBA1C MFR BLD: 6 % (ref 4–5.6)
HCT VFR BLD AUTO: 44.3 % (ref 35–47)
HDLC SERPL-MCNC: 66 MG/DL
HDLC SERPL: 2.8 (ref 0–5)
HGB BLD-MCNC: 14.2 G/DL (ref 11.5–16)
IMM GRANULOCYTES # BLD AUTO: 0 K/UL (ref 0–0.04)
IMM GRANULOCYTES NFR BLD AUTO: 0 % (ref 0–0.5)
LDLC SERPL CALC-MCNC: 99.2 MG/DL (ref 0–100)
LYMPHOCYTES # BLD: 1.7 K/UL (ref 0.8–3.5)
LYMPHOCYTES NFR BLD: 26 % (ref 12–49)
MCH RBC QN AUTO: 30.5 PG (ref 26–34)
MCHC RBC AUTO-ENTMCNC: 32.1 G/DL (ref 30–36.5)
MCV RBC AUTO: 95.3 FL (ref 80–99)
MICROALBUMIN UR-MCNC: <0.5 MG/DL
MICROALBUMIN/CREAT UR-RTO: <18 MG/G (ref 0–30)
MONOCYTES # BLD: 0.6 K/UL (ref 0–1)
MONOCYTES NFR BLD: 9 % (ref 5–13)
NEUTS SEG # BLD: 3.9 K/UL (ref 1.8–8)
NEUTS SEG NFR BLD: 62 % (ref 32–75)
NRBC # BLD: 0 K/UL (ref 0–0.01)
NRBC BLD-RTO: 0 PER 100 WBC
PLATELET # BLD AUTO: 132 K/UL (ref 150–400)
POTASSIUM SERPL-SCNC: 4.6 MMOL/L (ref 3.5–5.1)
PROT SERPL-MCNC: 6.6 G/DL (ref 6.4–8.2)
RBC # BLD AUTO: 4.65 M/UL (ref 3.8–5.2)
SODIUM SERPL-SCNC: 137 MMOL/L (ref 136–145)
TRIGL SERPL-MCNC: 89 MG/DL
TSH SERPL DL<=0.05 MIU/L-ACNC: 1.55 UIU/ML (ref 0.36–3.74)
VLDLC SERPL CALC-MCNC: 17.8 MG/DL
WBC # BLD AUTO: 6.3 K/UL (ref 3.6–11)

## 2024-09-26 RX ORDER — MOMETASONE FUROATE MONOHYDRATE 50 UG/1
2 SPRAY, METERED NASAL DAILY
COMMUNITY
Start: 2024-09-26

## 2024-09-26 RX ORDER — ELETRIPTAN HYDROBROMIDE 40 MG/1
TABLET, FILM COATED ORAL
Qty: 12 TABLET | Refills: 5 | Status: CANCELLED | OUTPATIENT
Start: 2024-09-26

## 2024-09-26 RX ORDER — CETIRIZINE HYDROCHLORIDE 10 MG/1
TABLET ORAL
COMMUNITY

## 2024-11-25 ENCOUNTER — HOSPITAL ENCOUNTER (OUTPATIENT)
Facility: HOSPITAL | Age: 59
Discharge: HOME | End: 2024-11-28
Payer: COMMERCIAL

## 2024-11-25 DIAGNOSIS — Z12.31 SCREENING MAMMOGRAM FOR BREAST CANCER: ICD-10-CM

## 2024-11-25 PROCEDURE — 77063 BREAST TOMOSYNTHESIS BI: CPT

## 2024-12-10 ENCOUNTER — OFFICE VISIT (OUTPATIENT)
Facility: CLINIC | Age: 59
End: 2024-12-10
Payer: COMMERCIAL

## 2024-12-10 VITALS
DIASTOLIC BLOOD PRESSURE: 73 MMHG | TEMPERATURE: 98.7 F | HEIGHT: 68 IN | OXYGEN SATURATION: 98 % | WEIGHT: 212 LBS | HEART RATE: 61 BPM | RESPIRATION RATE: 20 BRPM | BODY MASS INDEX: 32.13 KG/M2 | SYSTOLIC BLOOD PRESSURE: 110 MMHG

## 2024-12-10 DIAGNOSIS — J01.00 ACUTE NON-RECURRENT MAXILLARY SINUSITIS: Primary | ICD-10-CM

## 2024-12-10 DIAGNOSIS — R68.89 LOW BODY TEMPERATURE: ICD-10-CM

## 2024-12-10 LAB
ERYTHROCYTE [DISTWIDTH] IN BLOOD BY AUTOMATED COUNT: 12 % (ref 11.5–14.5)
HCT VFR BLD AUTO: 40.8 % (ref 35–47)
HGB BLD-MCNC: 13.6 G/DL (ref 11.5–16)
MCH RBC QN AUTO: 31.1 PG (ref 26–34)
MCHC RBC AUTO-ENTMCNC: 33.3 G/DL (ref 30–36.5)
MCV RBC AUTO: 93.2 FL (ref 80–99)
NRBC # BLD: 0 K/UL (ref 0–0.01)
NRBC BLD-RTO: 0 PER 100 WBC
PLATELET # BLD AUTO: 187 K/UL (ref 150–400)
PMV BLD AUTO: 11.2 FL (ref 8.9–12.9)
RBC # BLD AUTO: 4.38 M/UL (ref 3.8–5.2)
WBC # BLD AUTO: 9.5 K/UL (ref 3.6–11)

## 2024-12-10 PROCEDURE — 99213 OFFICE O/P EST LOW 20 MIN: CPT | Performed by: PHYSICIAN ASSISTANT

## 2024-12-10 RX ORDER — BENZONATATE 100 MG/1
100-200 CAPSULE ORAL 3 TIMES DAILY PRN
Qty: 60 CAPSULE | Refills: 0 | Status: SHIPPED | OUTPATIENT
Start: 2024-12-10 | End: 2024-12-17

## 2024-12-10 RX ORDER — AZITHROMYCIN 250 MG/1
TABLET, FILM COATED ORAL
Qty: 6 TABLET | Refills: 0 | Status: SHIPPED | OUTPATIENT
Start: 2024-12-10 | End: 2024-12-20

## 2024-12-10 ASSESSMENT — PATIENT HEALTH QUESTIONNAIRE - PHQ9
1. LITTLE INTEREST OR PLEASURE IN DOING THINGS: NOT AT ALL
SUM OF ALL RESPONSES TO PHQ QUESTIONS 1-9: 0
2. FEELING DOWN, DEPRESSED OR HOPELESS: NOT AT ALL
SUM OF ALL RESPONSES TO PHQ9 QUESTIONS 1 & 2: 0
SUM OF ALL RESPONSES TO PHQ QUESTIONS 1-9: 0

## 2024-12-10 NOTE — PROGRESS NOTES
Dunia Oviedo is a 59 y.o. female , id x 2(name and ). Reviewed record, history, and  medications.      Chief Complaint   Patient presents with    Cold Symptoms     Patient c/o chest congestion, ear pain, throat pain, cough, green mucous, yellow mucous from nose, fatigue, x1 week. Went to Patient , dx: viral, covid-strep-flu/negative.         Vitals:    12/10/24 0944   Weight: 96.2 kg (212 lb)   Height: 1.727 m (5' 8\")             12/10/2024     9:48 AM   PHQ-9    Little interest or pleasure in doing things 0   Feeling down, depressed, or hopeless 0   PHQ-2 Score 0   PHQ-9 Total Score 0            No data to display                Social Determinants of Health     Tobacco Use: Low Risk  (2024)    Patient History     Smoking Tobacco Use: Never     Smokeless Tobacco Use: Never     Passive Exposure: Not on file   Alcohol Use: Not on file   Financial Resource Strain: Low Risk  (3/26/2024)    Overall Financial Resource Strain (CARDIA)     Difficulty of Paying Living Expenses: Not very hard   Food Insecurity: No Food Insecurity (3/26/2024)    Hunger Vital Sign     Worried About Running Out of Food in the Last Year: Never true     Ran Out of Food in the Last Year: Never true   Transportation Needs: Unknown (3/26/2024)    PRAPARE - Transportation     Lack of Transportation (Medical): Not on file     Lack of Transportation (Non-Medical): No   Physical Activity: Not on file   Stress: Not on file   Social Connections: Not on file   Intimate Partner Violence: Not on file   Depression: Not at risk (3/26/2024)    PHQ-2     PHQ-2 Score: 0   Housing Stability: Unknown (3/26/2024)    Housing Stability Vital Sign     Unable to Pay for Housing in the Last Year: Not on file     Number of Places Lived in the Last Year: Not on file     Unstable Housing in the Last Year: No   Interpersonal Safety: Not on file   Utilities: Not on file       \"Have you been to the ER, urgent care clinic since your last visit?

## 2024-12-10 NOTE — PROGRESS NOTES
Dunia Oviedo (:  1965) is a 59 y.o. female, here for evaluation of the following chief complaint(s):  Cold Symptoms (Patient c/o chest congestion, ear pain, throat pain, cough, green mucous, yellow mucous from nose, fatigue, x1 week. Went to Patient , dx: viral, covid-strep-flu/negative.)         Assessment & Plan  1. Upper respiratory infection.  Symptoms include chest congestion, ear pain, throat pain, and green mucus, persisting for seven days. COVID-19 and strep tests were negative, suggesting a viral etiology initially. However, due to the duration of symptoms, a bacterial infection is now considered. A prescription for Z-Kal (azithromycin) will be provided. Tessalon Perles will be prescribed for cough management, to be taken every 8 hours as needed. A complete blood count (CBC) will be ordered to assess overall health status. She is advised to maintain adequate hydration and rest. A work note will be issued, allowing her to resume work on 2024.    2. Low body temperature.  The patient reports a low body temperature, fluctuating between 95.8 and 97.6 degrees Fahrenheit. This could be due to hypothermia or an inaccurate thermometer. A lab test will be conducted to rule out anemia and other potential causes.    Results  Laboratory Studies  COVID-19 test and strep test were negative.  1. Acute non-recurrent maxillary sinusitis  -     azithromycin (ZITHROMAX) 250 MG tablet; 500mg on day 1 followed by 250mg on days 2 - 5, Disp-6 tablet, R-0Normal  -     benzonatate (TESSALON) 100 MG capsule; Take 1-2 capsules by mouth 3 times daily as needed for Cough, Disp-60 capsule, R-0Normal  2. Low body temperature  -     CBC; Future    No follow-ups on file.       Subjective   History of Present Illness  The patient presents for evaluation of chest congestion, ear pain, throat pain, and low body temperature.    She has been experiencing symptoms of chest congestion, ear pain, throat pain, and

## 2024-12-16 ENCOUNTER — CLINICAL DOCUMENTATION (OUTPATIENT)
Facility: CLINIC | Age: 59
End: 2024-12-16

## 2025-07-10 ENCOUNTER — TELEPHONE (OUTPATIENT)
Facility: CLINIC | Age: 60
End: 2025-07-10

## 2025-07-10 NOTE — TELEPHONE ENCOUNTER
----- Message from SANTOS Valdivia NP sent at 7/9/2025  9:03 PM EDT -----  Regarding: FW: [facility] Appointment Request  Needs appt. Heather  ----- Message -----  From: Tamia Berkowitz MA  Sent: 7/9/2025   4:55 PM EDT  To: SANTOS Cabrales NP  Subject: FW: [facility] Appointment Request                        ----- Message -----  From: Nora Fabian  Sent: 7/9/2025   4:54 PM EDT  To: [facility] Clinical Staff  Subject: [facility] Appointment Request                          [facility] Appointment Request    Patient needs appointment for [facility] Appointment Type: Annual Visit./ A1C    Patient Requested Dates(s):August - Sept 2025  Patient Requested Time: morning  Provider Name:Heather Day APRN - NP    Reason for Appointment Request: Established Patient - Available appointments did not meet patient need/Patient needed to know new location of the PCP  --------------------------------------------------------------------------------------------------------------------------    Relationship to Patient: Self     Call Back Information: OK to leave message on voicemail  Preferred Call Back Number: [phone]